# Patient Record
Sex: MALE | Race: BLACK OR AFRICAN AMERICAN | NOT HISPANIC OR LATINO | Employment: FULL TIME | URBAN - METROPOLITAN AREA
[De-identification: names, ages, dates, MRNs, and addresses within clinical notes are randomized per-mention and may not be internally consistent; named-entity substitution may affect disease eponyms.]

---

## 2018-06-19 ENCOUNTER — HOSPITAL ENCOUNTER (EMERGENCY)
Facility: HOSPITAL | Age: 38
Discharge: HOME | End: 2018-06-19
Attending: EMERGENCY MEDICINE | Admitting: EMERGENCY MEDICINE

## 2018-06-19 VITALS
WEIGHT: 193 LBS | OXYGEN SATURATION: 99 % | HEART RATE: 73 BPM | SYSTOLIC BLOOD PRESSURE: 135 MMHG | TEMPERATURE: 98.6 F | RESPIRATION RATE: 18 BRPM | BODY MASS INDEX: 31.63 KG/M2 | DIASTOLIC BLOOD PRESSURE: 88 MMHG
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
## 2018-06-19 DIAGNOSIS — L25.9 CONTACT DERMATITIS: Primary | ICD-10-CM

## 2018-06-19 PROCEDURE — 99282 EMERGENCY DEPT VISIT SF MDM: CPT

## 2018-06-19 RX ORDER — METHYLPREDNISOLONE 4 MG/1
TABLET ORAL
Qty: 21 TABLET | Refills: 0 | Status: SHIPPED | OUTPATIENT
Start: 2018-06-19 | End: 2022-07-27

## 2018-06-19 RX ORDER — PREDNISONE 20 MG/1
60 TABLET ORAL ONCE
Status: COMPLETED | OUTPATIENT
Start: 2018-06-19 | End: 2018-06-19

## 2018-06-19 RX ADMIN — PREDNISONE 60 MG: 20 TABLET ORAL at 13:02

## 2018-06-19 NOTE — ED PROVIDER NOTES
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  Patient is not sure if he was exposed to any poison as plans  Patient now has scattered itchiness through his bilateral upper extremities and upper back  Patient denies any tongue swelling, lip swelling, shortness of breath, chest pain, nausea, vomiting, diarrhea, dysuria, headaches, weakness or dizziness  History provided by:  Patient      None       History reviewed  No pertinent past medical history  History reviewed  No pertinent surgical history  History reviewed  No pertinent family history  I have reviewed and agree with the history as documented  Social History   Substance Use Topics    Smoking status: Current Every Day Smoker     Packs/day: 0 50    Smokeless tobacco: Not on file    Alcohol use Yes        Review of Systems   Constitutional: Negative for activity change, fatigue and fever  HENT: Negative for congestion, ear discharge and sore throat  Eyes: Negative for pain and redness  Respiratory: Negative for cough, chest tightness, shortness of breath and wheezing  Cardiovascular: Negative for chest pain  Gastrointestinal: Negative for abdominal pain, diarrhea, nausea and vomiting  Endocrine: Negative for cold intolerance  Genitourinary: Negative for dysuria and urgency  Musculoskeletal: Negative for arthralgias and back pain  Skin: Positive for rash  Neurological: Negative for dizziness, weakness and headaches  Psychiatric/Behavioral: Negative for agitation and behavioral problems         Physical Exam  Physical Exam   Constitutional: He is oriented to person, place, and time  He appears well-developed and well-nourished  HENT:   Head: Normocephalic and atraumatic  Nose: Nose normal    Mouth/Throat: Oropharynx is clear and moist    Eyes: Conjunctivae and EOM are normal    Neck: Normal range of motion  Neck supple  Cardiovascular: Normal rate, regular rhythm and normal heart sounds  Pulmonary/Chest: Effort normal and breath sounds normal    Abdominal: Soft  Bowel sounds are normal  He exhibits no distension  There is no tenderness  Musculoskeletal: Normal range of motion  Neurological: He is alert and oriented to person, place, and time  Skin: Skin is warm  2 cm x 2 cm area of erythema and induration noted to left anterior forearm that is consistent of insect bite  Scattered maculopapular lesions noted to the right forearm and upper back  Psychiatric: He has a normal mood and affect  His behavior is normal  Judgment and thought content normal    Nursing note and vitals reviewed        Vital Signs  ED Triage Vitals [06/19/18 1244]   Temperature Pulse Respirations Blood Pressure SpO2   98 6 °F (37 °C) 73 18 135/88 99 %      Temp Source Heart Rate Source Patient Position - Orthostatic VS BP Location FiO2 (%)   Tympanic Monitor -- -- --      Pain Score       --           Vitals:    06/19/18 1244 06/19/18 1245   BP: 135/88 135/88   Pulse: 73        Visual Acuity      ED Medications  Medications   predniSONE tablet 60 mg (60 mg Oral Given 6/19/18 1302)       Diagnostic Studies  Results Reviewed     None                 No orders to display              Procedures  Procedures       Phone Contacts  ED Phone Contact    ED Course                               MDM  Number of Diagnoses or Management Options  Contact dermatitis:   Insect bite, initial encounter:   Risk of Complications, Morbidity, and/or Mortality  General comments: Patient's history and physical is consistent with insect bite and contact dermatitis  At this point patient is placed on prednisone taper  Patient is discharged home with p r n  Benadryl for itching and hydrocortisone ointment to the insect bite  Patient to follow up with PCP in 2-3 days  Close return instructions given to return to the ER for any worsening symptoms  Patient agrees with discharge plan  Patient well appearing at time of discharge  Patient Progress  Patient progress: stable    CritCare Time    Disposition  Final diagnoses:   Contact dermatitis   Insect bite, initial encounter     Time reflects when diagnosis was documented in both MDM as applicable and the Disposition within this note     Time User Action Codes Description Comment    6/19/2018 12:53 PM La aHnkins Add [L25 9] Contact dermatitis     6/19/2018 12:53 PM Rosemarie Cantu Add Cuate Bynum  XXXA] Insect bite, initial encounter       ED Disposition     ED Disposition Condition Comment    Discharge  Aleta Singh discharge to home/self care  Condition at discharge: Good        Follow-up Information     Follow up With Specialties Details Why [address], [place] Family Medicine In 2 days  [facility]  [address] [place] [address]  [phone]            Patient's Medications   Discharge Prescriptions    METHYLPREDNISOLONE 4 MG TBPK    Use as directed on package       Start Date: 6/19/2018 End Date: --       Order Dose: --       Quantity: 21 tablet    Refills: 0     No discharge procedures on file      ED Provider  Electronically Signed by           Velma Ryder DO  06/19/18 1490

## 2018-06-19 NOTE — DISCHARGE INSTRUCTIONS
Please take a list of all of your medications and discharge paperwork with you to all of your follow-up medical visits  You can take 50 mg Benadryl every 6 hr as needed for itching  Please apply over-the-counter hydrocortisone ointment to the insect bite  Please take all of your medications as directed  Please call your family doctor or return to the ER if you have increased shortness of breath, chest pain, fevers, chills, nausea, vomiting, diarrhea, or any other worsening symptoms  Contact Dermatitis   WHAT YOU NEED TO KNOW:   Contact dermatitis is a skin rash  It develops when you touch something that irritates your skin or causes an allergic reaction  DISCHARGE INSTRUCTIONS:   Call 911 for any of the following:   · You have sudden trouble breathing  · Your throat swells and you have trouble eating  · Your face is swollen  Contact your healthcare provider if:   · You have a fever  · Your blisters are draining pus  · Your rash spreads or does not get better, even after treatment  · You have questions or concerns about your condition or care  Medicines:   · Medicines  help decrease itching and swelling  They will be given as a topical medicine to apply to your rash or as a pill  · Take your medicine as directed  Contact your healthcare provider if you think your medicine is not helping or if you have side effects  Tell him or her if you are allergic to any medicine  Keep a list of the medicines, vitamins, and herbs you take  Include the amounts, and when and why you take them  Bring the list or the pill bottles to follow-up visits  Carry your medicine list with you in case of an emergency  Manage contact dermatitis:   · Take short baths or showers in cool water  Use mild soap or soap-free cleansers  Add oatmeal, baking soda, or cornstarch to the bath water to help decrease skin irritation  · Avoid skin irritants , such as makeup, hair products, soaps, and cleansers   Use products that do not contain perfume or dye  · Apply a cool compress to your rash  This will help soothe your skin  · Keep your skin moist   Rub unscented cream or lotion on your skin to prevent dryness and itching  Do this right after a bath or shower when your skin is still damp  Follow up with your healthcare provider or dermatologist in 2 to 3 days:  Write down your questions so you remember to ask them during your visits  © 2017 [address]  Information is for End User's use only and may not be sold, redistributed or otherwise used for commercial purposes  All illustrations and images included in CareNotes® are the copyrighted property of A D A M , Inc  or Alexandru Gardner  The above information is an  only  It is not intended as medical advice for individual conditions or treatments  Talk to your doctor, nurse or pharmacist before following any medical regimen to see if it is safe and effective for you  Insect Bite or Sting   WHAT YOU NEED TO KNOW:   Most insect bites and stings are not dangerous and go away without treatment  Your symptoms may be mild, or you may develop anaphylaxis  Anaphylaxis is a sudden, life-threatening reaction that needs immediate treatment  Common examples of insects that bite or sting are bees, ticks, mosquitoes, spiders, and ants  Insect bites or stings can lead to diseases such as malaria, West Nile virus, Lyme disease, or [place] Spotted Fever  DISCHARGE INSTRUCTIONS:   Call 911 for signs or symptoms of anaphylaxis,  such as trouble breathing, swelling in your mouth or throat, or wheezing  You may also have itching, a rash, hives, or feel like you are going to faint  Return to the emergency department if:   · You are stung on your tongue or in your throat  · A white area forms around the bite  · You are sweating badly or have body pain  · You think you were bitten or stung by a poisonous insect    Contact your healthcare provider if:   · You have a fever  · The area becomes red, warm, tender, and swollen beyond the area of the bite or sting  · You have questions or concerns about your condition or care  Medicines:   · Antihistamines  decrease itching and rash  · Epinephrine  is used to treat severe allergic reactions such as anaphylaxis  · Take your medicine as directed  Contact your healthcare provider if you think your medicine is not helping or if you have side effects  Tell him of her if you are allergic to any medicine  Keep a list of the medicines, vitamins, and herbs you take  Include the amounts, and when and why you take them  Bring the list or the pill bottles to follow-up visits  Carry your medicine list with you in case of an emergency  Steps to take for signs or symptoms of anaphylaxis:   · Immediately  give 1 shot of epinephrine only into the outer thigh muscle  · Leave the shot in place  as directed  Your healthcare provider may recommend you leave it in place for up to 10 seconds before you remove it  This helps make sure all of the epinephrine is delivered  · Call 911 and go to the emergency department,  even if the shot improved symptoms  Do not drive yourself  Bring the used epinephrine shot with you  Safety precautions to take if you are at risk for anaphylaxis:   · Keep 2 shots of epinephrine with you at all times  You may need a second shot, because epinephrine only works for about 20 minutes and symptoms may return  Your healthcare provider can show you and family members how to give the shot  Check the expiration date every month and replace it before it expires  · Create an action plan  Your healthcare provider can help you create a written plan that explains the allergy and an emergency plan to treat a reaction   The plan explains when to give a second epinephrine shot if symptoms return or do not improve after the first  Give copies of the action plan and emergency instructions to family members, work and school staff, and  providers  Show them how to give a shot of epinephrine  · Carry medical alert identification  Wear medical alert jewelry or carry a card that says you have an insect allergy  Ask your healthcare provider where to get these items  If an insect bites or stings you:   · Remove the stinger  Scrape the stinger out with your fingernail, edge of a credit card, or a knife blade  Do not squeeze the wound  Gently wash the area with soap and water  · Remove the tick  Ticks must be removed as soon as possible so you do not get diseases passed through tick bites  Ask your healthcare provider for more information on tick bites and how to remove ticks  Care for a bite or sting wound:   · Elevate the affected area  Prop the wound above the level of your heart, if possible  Elevate the area for 10 to 20 minutes each hour or as directed by your healthcare provider  · Use compresses  Soak a clean washcloth in cold water, wring it out, and put it on the bite or sting  Use the compress for 10 to 20 minutes each hour or as directed by your healthcare provider  After 24 to 48 hours, change to warm compresses  · Apply a paste  Add water to baking soda to make a thick paste  Put the paste on the area for 5 minutes  Rinse gently to remove the paste  Prevent another insect bite or sting:   · Do not wear bright-colored or flower-print clothing when you plan to spend time outdoors  Do not use hairspray, perfumes, or aftershave  · Do not leave food out  · Empty any standing water and wash container with soap and water every 2 days  · Put screens on all open windows and doors  · Put insect repellent that contains DEET on skin that is showing when you go outside  Put insect repellent at the top of your boots, bottom of pant legs, and sleeve cuffs  Wear long sleeves, pants, and shoes      · Use citronella candles outdoors to help keep mosquitoes away  Put a tick and flea collar on pets  Follow up with your healthcare provider as directed:  Write down your questions so you remember to ask them during your visits  © 2017 [address] Information is for End User's use only and may not be sold, redistributed or otherwise used for commercial purposes  All illustrations and images included in CareNotes® are the copyrighted property of A D A M , Inc  or Alexandru Gardner  The above information is an  only  It is not intended as medical advice for individual conditions or treatments  Talk to your doctor, nurse or pharmacist before following any medical regimen to see if it is safe and effective for you

## 2018-06-28 ENCOUNTER — VBI (OUTPATIENT)
Dept: FAMILY MEDICINE CLINIC | Facility: CLINIC | Age: 38
End: 2018-06-28

## 2018-06-28 NOTE — TELEPHONE ENCOUNTER
Pt was seen in [address] on 6/19/18  CC: Rash  DX: Contact dermatitis; insect bite  Left message pt is aware of office hours and phone number

## 2022-07-27 ENCOUNTER — OFFICE VISIT (OUTPATIENT)
Dept: FAMILY MEDICINE CLINIC | Facility: CLINIC | Age: 42
End: 2022-07-27
Payer: COMMERCIAL

## 2022-07-27 VITALS
HEIGHT: 66 IN | HEART RATE: 59 BPM | TEMPERATURE: 96.7 F | DIASTOLIC BLOOD PRESSURE: 88 MMHG | RESPIRATION RATE: 17 BRPM | WEIGHT: 183.2 LBS | OXYGEN SATURATION: 99 % | BODY MASS INDEX: 29.44 KG/M2 | SYSTOLIC BLOOD PRESSURE: 138 MMHG

## 2022-07-27 DIAGNOSIS — M54.50 ACUTE BILATERAL LOW BACK PAIN WITHOUT SCIATICA: Primary | ICD-10-CM

## 2022-07-27 DIAGNOSIS — F31.30 BIPOLAR AFFECTIVE DISORDER, CURRENT EPISODE DEPRESSED, CURRENT EPISODE SEVERITY UNSPECIFIED (HCC): ICD-10-CM

## 2022-07-27 DIAGNOSIS — F32.A DEPRESSION, UNSPECIFIED DEPRESSION TYPE: ICD-10-CM

## 2022-07-27 DIAGNOSIS — F43.10 PTSD (POST-TRAUMATIC STRESS DISORDER): ICD-10-CM

## 2022-07-27 DIAGNOSIS — F41.9 ANXIETY: ICD-10-CM

## 2022-07-27 LAB
SL AMB  POCT GLUCOSE, UA: NEGATIVE
SL AMB LEUKOCYTE ESTERASE,UA: NEGATIVE
SL AMB POCT BILIRUBIN,UA: NEGATIVE
SL AMB POCT BLOOD,UA: NEGATIVE
SL AMB POCT CLARITY,UA: CLEAR
SL AMB POCT COLOR,UA: YELLOW
SL AMB POCT KETONES,UA: NEGATIVE
SL AMB POCT NITRITE,UA: NEGATIVE
SL AMB POCT PH,UA: 6
SL AMB POCT SPECIFIC GRAVITY,UA: 1.02
SL AMB POCT URINE PROTEIN: NEGATIVE
SL AMB POCT UROBILINOGEN: NORMAL

## 2022-07-27 PROCEDURE — 81003 URINALYSIS AUTO W/O SCOPE: CPT | Performed by: NURSE PRACTITIONER

## 2022-07-27 PROCEDURE — 3725F SCREEN DEPRESSION PERFORMED: CPT | Performed by: NURSE PRACTITIONER

## 2022-07-27 PROCEDURE — 87086 URINE CULTURE/COLONY COUNT: CPT | Performed by: NURSE PRACTITIONER

## 2022-07-27 PROCEDURE — 99203 OFFICE O/P NEW LOW 30 MIN: CPT | Performed by: NURSE PRACTITIONER

## 2022-07-27 NOTE — PROGRESS NOTES
Assessment/Plan:  Will return to office if symptoms reoccurs  1  Acute bilateral low back pain without sciatica  -     POCT urine dip auto non-scope  -     Urine culture    2  Bipolar affective disorder, current episode depressed, current episode severity unspecified ([facility] [address] )  -     Ambulatory Referral to Psychiatry; Future    3  PTSD (post-traumatic stress disorder)  -     Ambulatory Referral to Psychiatry; Future    4  Anxiety  -     Ambulatory Referral to Psychiatry; Future    5  Depression, unspecified depression type  -     Ambulatory Referral to Psychiatry; Future              Patient Instructions:  Supportive care discussed and advised  Advised to RTO for any worsening and no improvement  Follow up for no improvement and worsening of conditions  Patient advised and educated when to see immediate medical care  Return if symptoms worsen or fail to improve  No future appointments  Subjective:      Patient ID: Nidhi Rockwell is a 38 y o  male  Chief Complaint   Patient presents with    Flank Pain     Kidney pain on both sides for the past few days nm  lpn    Urinary Frequency         Vitals:  /88   Pulse 59   Temp (!) 96 7 °F (35 9 °C)   Resp 17   Ht 5' 5 5" (1 664 m)   Wt 83 1 kg (183 lb 3 2 oz)   SpO2 99%   BMI 30 02 kg/m²     HPI  New to practice  Personal and family medical history reviewed  Stated that from 7/23 to 7/24 had bilateral back pain at kidney area but no urinary symptoms and issues  Took azo on 7/24/2022 and kept himself hydrated and pain is resolved now but as already had appt then wanted to come up  Currently asymptomatic  Urine dip in office is unremarkable and will follow with urine culture results  Has h/o bipolar, anxiety, depression and PTSD and recently moved back from [facility] and used to live here and was under care of family guidance and will follow back with them   Stated that was not able to work due to his anxiety and quit job and would like to discuss disability with family guidance as informed that we do not do disability in this office and needs to follow with specialist  The patient was advised about the office disability policy and that no forms of this nature would be promised or completed  This was explained clearly to the patient's understanding  PHQ-2/9 Depression Screening    Little interest or pleasure in doing things: 1 - several days  Feeling down, depressed, or hopeless: 2 - more than half the days  Trouble falling or staying asleep, or sleeping too much: 3 - nearly every day  Feeling tired or having little energy: 1 - several days  Poor appetite or overeatin - not at all  Feeling bad about yourself - or that you are a failure or have let yourself or your family down: 1 - several days  Trouble concentrating on things, such as reading the newspaper or watching television: 0 - not at all  Moving or speaking so slowly that other people could have noticed  Or the opposite - being so fidgety or restless that you have been moving around a lot more than usual: 1 - several days  Thoughts that you would be better off dead, or of hurting yourself in some way: 0 - not at all  PHQ-2 Score: 3  PHQ-2 Interpretation: POSITIVE depression screen  PHQ-9 Score: 9   PHQ-9 Interpretation: Mild depression              The following portions of the patient's history were reviewed and updated as appropriate: allergies, current medications, past family history, past medical history, past social history, past surgical history and problem list       Review of Systems   Constitutional: Negative for chills, diaphoresis, fatigue, fever and unexpected weight change  Respiratory: Negative  Cardiovascular: Negative  Gastrointestinal: Negative for abdominal pain, nausea and vomiting  Genitourinary: Negative for decreased urine volume, difficulty urinating, dysuria, flank pain, frequency, genital sores, hematuria and urgency     Musculoskeletal: Negative  Skin: Negative  Neurological: Negative for dizziness and headaches  Objective:    Social History     Tobacco Use   Smoking Status Current Every Day Smoker    Packs/day: 0 50   Smokeless Tobacco Never Used   Tobacco Comment    trying to quit under 6 cigrates a day       Allergies: No Known Allergies      Current Outpatient Medications   Medication Sig Dispense Refill    sertraline (ZOLOFT) 50 mg tablet Take 50 mg by mouth daily      VITAMIN D PO Take by mouth once a week       No current facility-administered medications for this visit  Physical Exam  Constitutional:       Appearance: Normal appearance  He is well-developed  Cardiovascular:      Rate and Rhythm: Normal rate and regular rhythm  Heart sounds: Normal heart sounds  Pulmonary:      Effort: Pulmonary effort is normal       Breath sounds: Normal breath sounds  Abdominal:      General: Bowel sounds are normal  There is no distension  Palpations: Abdomen is soft  Tenderness: There is no abdominal tenderness  There is no right CVA tenderness, left CVA tenderness or rebound  Musculoskeletal:         General: No tenderness  Normal range of motion  Skin:     General: Skin is warm and dry  Neurological:      Mental Status: He is alert and oriented to person, place, and time  Psychiatric:         Behavior: Behavior normal          Thought Content:  Thought content normal          Judgment: Judgment normal              Recent Results (from the past 24 hour(s))   POCT urine dip auto non-scope    Collection Time: 07/27/22 10:53 AM   Result Value Ref Range     COLOR,UA yellow     CLARITY,UA clear     SPECIFIC GRAVITY,UA 1 020      PH,UA 6 0     LEUKOCYTE ESTERASE,UA negative     NITRITE,UA negative     GLUCOSE, UA negative     KETONES,UA negative     BILIRUBIN,UA negative     BLOOD,UA negative     POCT URINE PROTEIN negative     SL AMB POCT UROBILINOGEN 0 2 E U  /dL              ANIRUDH Miles

## 2022-07-29 LAB — BACTERIA UR CULT: ABNORMAL

## 2022-08-02 ENCOUNTER — TELEPHONE (OUTPATIENT)
Dept: FAMILY MEDICINE CLINIC | Facility: CLINIC | Age: 42
End: 2022-08-02

## 2022-08-02 ENCOUNTER — TELEPHONE (OUTPATIENT)
Dept: PSYCHIATRY | Facility: CLINIC | Age: 42
End: 2022-08-02

## 2022-08-02 NOTE — TELEPHONE ENCOUNTER
----- Message from Carlos Wilson, Skylar Stephen sent at 8/2/2022  2:15 PM EDT -----  Please let patient know that his urine showed some bacteria but not enough to be positive for infection and if having any issues then please follow back   ANIRUDH Jean

## 2022-08-02 NOTE — TELEPHONE ENCOUNTER
Spoke to pt in regards to referral and informed pt of waitlist  Pt will be placed on waitlist for both talk therapy and med mgmt

## 2022-08-02 NOTE — TELEPHONE ENCOUNTER
Spoke to patient and informed him  He stated that he is not having any issues anymore  I informed him to call us back if anything changes and his symptoms come back  KASHMIR Ocasio, MARYN

## 2022-08-04 ENCOUNTER — TELEPHONE (OUTPATIENT)
Dept: PSYCHIATRY | Facility: CLINIC | Age: 42
End: 2022-08-04

## 2022-08-04 NOTE — TELEPHONE ENCOUNTER
Behavorial Health Outpatient Intake Questions    Referred by: Number on the back of insurance card     Please advised interviewee that they need to answer all questions truthfully to allow for best care and any misrepresentations of information may affect their ability to be seen at this clinic   => Was this discussed? Yes     [facility] -     Presenting Problem (in patient's words):   Been taking things personally which brings out anger and stress   Are there any developmental disabilities? ? If yes, can they speak to you on the phone? If they are too limited to speak to you on phone, refer out Yes, OCD    Are you taking any psychiatric medications? Yes, Dr Demarcus Oconnor    => If yes, who prescribes? If yes, are they injectable medications? Does the patient have a language barrier or hearing impairment? No    Have you been treated at [facility] by a therapist or a doctor in the past? If yes, who? Yes, [facility]     Has the patient been hospitalized for mental health? Yes   If yes, how long ago was last hospitalization and where was it? Checked self in was there for two hours and started [facility]   Do you actively use alcohol or marijuana or illegal substances? If yes, what and how much - refer out to Drug and alcohol treatment if use is excessive or daily use of illegal substances No concerns of substance abuse are reported  Do you have a community treatment team or ? No    Legal History-     Does the patient have any history of arrests, nursing home/penitentiary time, or DUIs? No  If Yes-  1) What types of charges? 2) When were they last incarcerated? 3) Are they currently on parole or probation? Minor Child-    Who has custody of the child? Is there a custody agreement?      If there is a custody agreement remind parent that they must bring a copy to the first appt or they will not be seen  Intake Team, please check with provider before scheduling if flags come up such as:  - complex case  - legal history (other than DUI)  - communication barrier concerns are present  - if, in your judgment, this needs further review    ACCEPTED as a patient Yes  => Appointment Date: Tuesday August 16, 2022 at 12:00 pm with Patricia Gonzalez and August 25, 2022 at 10:45 am with Dr Jacek Holloway? No    Name of Insurance Co: [employer] ID# [id number]  NLDURONDB Phone #  If ins is primary or secondary  If patient is a minor, parents information such as Name, D  O B of guarantor

## 2022-08-16 ENCOUNTER — SOCIAL WORK (OUTPATIENT)
Dept: BEHAVIORAL/MENTAL HEALTH CLINIC | Facility: CLINIC | Age: 42
End: 2022-08-16
Payer: COMMERCIAL

## 2022-08-16 DIAGNOSIS — F31.30 BIPOLAR AFFECTIVE DISORDER, CURRENT EPISODE DEPRESSED, CURRENT EPISODE SEVERITY UNSPECIFIED (HCC): Primary | ICD-10-CM

## 2022-08-16 DIAGNOSIS — F32.A DEPRESSION, UNSPECIFIED DEPRESSION TYPE: ICD-10-CM

## 2022-08-16 DIAGNOSIS — F41.1 GENERALIZED ANXIETY DISORDER: ICD-10-CM

## 2022-08-16 DIAGNOSIS — F43.10 PTSD (POST-TRAUMATIC STRESS DISORDER): ICD-10-CM

## 2022-08-16 PROCEDURE — 90791 PSYCH DIAGNOSTIC EVALUATION: CPT | Performed by: SOCIAL WORKER

## 2022-08-25 ENCOUNTER — OFFICE VISIT (OUTPATIENT)
Dept: PSYCHIATRY | Facility: CLINIC | Age: 42
End: 2022-08-25
Payer: COMMERCIAL

## 2022-08-25 DIAGNOSIS — F31.30 BIPOLAR AFFECTIVE DISORDER, CURRENT EPISODE DEPRESSED, CURRENT EPISODE SEVERITY UNSPECIFIED (HCC): ICD-10-CM

## 2022-08-25 DIAGNOSIS — F41.9 ANXIETY: ICD-10-CM

## 2022-08-25 DIAGNOSIS — F33.1 MODERATE EPISODE OF RECURRENT MAJOR DEPRESSIVE DISORDER (HCC): Primary | ICD-10-CM

## 2022-08-25 DIAGNOSIS — F43.10 PTSD (POST-TRAUMATIC STRESS DISORDER): ICD-10-CM

## 2022-08-25 PROCEDURE — 90792 PSYCH DIAG EVAL W/MED SRVCS: CPT | Performed by: NURSE PRACTITIONER

## 2022-08-25 RX ORDER — SERTRALINE HYDROCHLORIDE 100 MG/1
100 TABLET, FILM COATED ORAL DAILY
Qty: 30 TABLET | Refills: 3 | Status: SHIPPED | OUTPATIENT
Start: 2022-08-25 | End: 2022-09-22

## 2022-08-25 NOTE — BH TREATMENT PLAN
Whom  TREATMENT PLAN (Medication Management Only)        [facility]    Name and Date of Birth:  Libertad Liang 38 y o  1980  Date of Treatment Plan: August 25, 2022  Diagnosis/Diagnoses:    1  Moderate episode of recurrent major depressive disorder ([facility] )    2  Bipolar affective disorder, current episode depressed, current episode severity unspecified ([facility] )    3  PTSD (post-traumatic stress disorder)    4  Anxiety      Strengths/Personal Resources for Self-Care: supportive family, taking medications as prescribed, ability to communicate well, ability to listen, ability to reason, average or above intelligence, general fund of knowledge, motivation for treatment  Area/Areas of need (in own words): anxiety symptoms, depressive symptoms, mood instability  1  Long Term Goal: continue improvement in mood stability  Target Date:3 months - 11/25/2022  Person/Persons responsible for completion of goal: Garcia  2  Short Term Objective (s) - How will we reach this goal?:   A  Provider new recommended medication/dosage changes and/or continue medication(s): continue current medications as prescribed  B  N/A   C  N/A  Target Date:3 months - 11/25/2022  Person/Persons Responsible for Completion of Goal: [facility]  Progress Towards Goals: continuing treatment  Treatment Modality: medication management every 3 months  Review due 180 days from date of this plan: 3 months - 11/25/2022  Expected length of service: ongoing treatment  My Physician/PA/NP and I have developed this plan together and I agree to work on the goals and objectives  I understand the treatment goals that were developed for my treatment
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
## 2022-08-26 ENCOUNTER — TELEPHONE (OUTPATIENT)
Dept: PSYCHIATRY | Facility: CLINIC | Age: 42
End: 2022-08-26

## 2022-08-30 ENCOUNTER — SOCIAL WORK (OUTPATIENT)
Dept: BEHAVIORAL/MENTAL HEALTH CLINIC | Facility: CLINIC | Age: 42
End: 2022-08-30
Payer: COMMERCIAL

## 2022-08-30 DIAGNOSIS — F32.A DEPRESSION, UNSPECIFIED DEPRESSION TYPE: ICD-10-CM

## 2022-08-30 DIAGNOSIS — F41.1 GENERALIZED ANXIETY DISORDER: ICD-10-CM

## 2022-08-30 DIAGNOSIS — F43.10 PTSD (POST-TRAUMATIC STRESS DISORDER): Primary | ICD-10-CM

## 2022-08-30 PROCEDURE — 90834 PSYTX W PT 45 MINUTES: CPT | Performed by: SOCIAL WORKER

## 2022-08-30 NOTE — PSYCH
Psychotherapy Provided: Individual Psychotherapy 45 minutes     Length of time in session: 45 minutes, follow up in 2 week    Goals addressed in session: Goal 1 and Goal 2     Pain:  No      Current suicide risk : Low       DATA:  Candelaria Robin reported feeling slightly less anxious and depressed since our first meeting, sharing htat he felt heard and understood and has some hope  He reported that he has been socializing more which has been helpful  The bulk of hte session was spent creating his treatment plan together  Candelaria Robin became tearful toward the end when we began to speak about his relationships with his grandchildren as well as nightmares he has  Candelaria Robin reported that he has been keeping his gratitude journal as well and it has been helpful  Modalities used:  Client centered, compassioned focused, motivational interviewing, solution focused, strength based, trauma informed  ASSESSMENT: Candelaria Robin presented with sad/anxious mood and full affect  He was engaged and cooperative, tearful at times, with good eye contact  Garcia had good insight and judgement  His thought processes were well organized and showed no evidence of psychosis  Candelaria Robin presents with minimal risk for suicide, self harm or harm towards others  PLAN:  Candelaria Robin will continue with his gratitude journal   Begin to work on goals  [facility] Treatment [facility]: Diagnosis and Treatment Plan explained to Oanh Kirk relates understanding diagnosis and is agreeable to Treatment Plan   Yes

## 2022-08-30 NOTE — BH TREATMENT PLAN
Cade Thurston  1980       Date of Initial Treatment Plan: 8/30/22   Date of Current Treatment Plan: 08/30/22    Treatment Plan Number 1     Strengths/Personal Resources for Self Care: Exercise, take vitamins, eat healthy, like to meditate, read; loyal, quick to help people, empathy    Diagnosis:   No diagnosis found  Area of Needs: Anger, anxiety      Long Term Goal 1: "Ways to cope with edginess, can't keep still, learn ways to stay calm" - feel less anxious    Target Date: 11/30/22  Completion Date: N/A         Short Term Objectives for Goal 1: Kel Connors will describe worry and anxiety and how it impacts his daily functioning; Kel Connors will be educated on cognitive and psychological and behavioral components of anxiety; Kel Connors will learn and implement 3 calming skills; Kel Connors will be educated on CBT and identify, challenge and change anxiety producing thoughts    Long Term Goal 2: "Better ways to cope with anger other than screaming"    Target Date: 11/30/22  Completion Date: N/A    Short Term Objectives for Goal 2: Kel Connors will identify triggers to emotional dysregulation; Kel Connors will develop 3 alternate coping skills to address anger; Kel Connors will identify feelings that underly anger and learn and practice strategies to manage those emotions; Kel Connors will verbalize understanding of how anger response came to be and how he can work to let that go by learning alternate strategies to protect himself  GOAL 1: Modality: Individual 2x per month   Completion Date TBD and The person(s) responsible for carrying out the plan is  Garcia (client) Antonella Keenan (therapist)    GOAL 2: Modality: Individual 2x per month   Completion Date TBD and The person(s) responsible for carrying out the plan is  Garcia (client) and Prince Keenan (therapist)        [address]: Diagnosis and Treatment Plan explained to Kanu Bernard relates understanding diagnosis and is agreeable to Treatment Plan         Client Comments : Please share your thoughts, feelings, need and/or experiences regarding your treatment plan: none

## 2022-09-13 ENCOUNTER — SOCIAL WORK (OUTPATIENT)
Dept: BEHAVIORAL/MENTAL HEALTH CLINIC | Facility: CLINIC | Age: 42
End: 2022-09-13
Payer: COMMERCIAL

## 2022-09-13 DIAGNOSIS — F41.1 GENERALIZED ANXIETY DISORDER: ICD-10-CM

## 2022-09-13 DIAGNOSIS — F31.4 BIPOLAR DISORDER, CURRENT EPISODE DEPRESSED, SEVERE, WITHOUT PSYCHOTIC FEATURES (HCC): ICD-10-CM

## 2022-09-13 DIAGNOSIS — F43.10 PTSD (POST-TRAUMATIC STRESS DISORDER): Primary | ICD-10-CM

## 2022-09-13 PROCEDURE — 90834 PSYTX W PT 45 MINUTES: CPT | Performed by: SOCIAL WORKER
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
## 2022-09-22 ENCOUNTER — OFFICE VISIT (OUTPATIENT)
Dept: PSYCHIATRY | Facility: CLINIC | Age: 42
End: 2022-09-22
Payer: COMMERCIAL

## 2022-09-22 DIAGNOSIS — F31.4 BIPOLAR DISORDER, CURRENT EPISODE DEPRESSED, SEVERE, WITHOUT PSYCHOTIC FEATURES (HCC): ICD-10-CM

## 2022-09-22 DIAGNOSIS — F43.10 PTSD (POST-TRAUMATIC STRESS DISORDER): ICD-10-CM

## 2022-09-22 DIAGNOSIS — F31.81 BIPOLAR 2 DISORDER (HCC): Primary | ICD-10-CM

## 2022-09-22 PROCEDURE — 99214 OFFICE O/P EST MOD 30 MIN: CPT | Performed by: NURSE PRACTITIONER

## 2022-09-22 PROCEDURE — 90833 PSYTX W PT W E/M 30 MIN: CPT | Performed by: NURSE PRACTITIONER

## 2022-09-22 RX ORDER — RISPERIDONE 0.5 MG/1
0.5 TABLET, FILM COATED ORAL 2 TIMES DAILY
Qty: 30 TABLET | Refills: 3 | Status: SHIPPED | OUTPATIENT
Start: 2022-09-22

## 2022-09-22 NOTE — BH TREATMENT PLAN
TREATMENT PLAN (Medication Management Only)        [facility]    Name and Date of Birth:  Irasema Moon 38 y o  1980  Date of Treatment Plan: September 22, 2022  Diagnosis/Diagnoses:    1  Bipolar 2 disorder ([facility] )    2  Bipolar disorder, current episode depressed, severe, without psychotic features ([facility] )    3  PTSD (post-traumatic stress disorder)      Strengths/Personal Resources for Self-Care: supportive family, ability to communicate well, ability to listen, ability to reason, average or above intelligence, motivation for treatment  Area/Areas of need (in own words): depressive symptoms  1  Long Term Goal: continue improvement in depression  Target Date:3 months - 12/22/2022  Person/Persons responsible for completion of goal: Garcia Lainez  Short Term Objective (s) - How will we reach this goal?:   A  Provider new recommended medication/dosage changes and/or continue medication(s): continue current medications as prescribed  B  N/A   C  N/A  Target Date:3 months - 12/22/2022  Person/Persons Responsible for Completion of Goal: Terrance Shipley  Progress Towards Goals: continuing treatment  Treatment Modality: medication management every 3 months  Review due 180 days from date of this plan: 3 months - 12/22/2022  Expected length of service: ongoing treatment  My Physician/PA/NP and I have developed this plan together and I agree to work on the goals and objectives  I understand the treatment goals that were developed for my treatment
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
## 2022-10-20 ENCOUNTER — OFFICE VISIT (OUTPATIENT)
Dept: PSYCHIATRY | Facility: CLINIC | Age: 42
End: 2022-10-20
Payer: COMMERCIAL

## 2022-10-20 DIAGNOSIS — F31.4 BIPOLAR DISORDER, CURRENT EPISODE DEPRESSED, SEVERE, WITHOUT PSYCHOTIC FEATURES (HCC): Primary | ICD-10-CM

## 2022-10-20 PROCEDURE — 99214 OFFICE O/P EST MOD 30 MIN: CPT | Performed by: NURSE PRACTITIONER

## 2022-10-20 PROCEDURE — 90833 PSYTX W PT W E/M 30 MIN: CPT | Performed by: NURSE PRACTITIONER

## 2022-10-20 NOTE — BH TREATMENT PLAN
TREATMENT PLAN (Medication Management Only)        [facility]    Name and Date of Birth:  Clive Carr 38 y o  1980  Date of Treatment Plan: October 20, 2022  Diagnosis/Diagnoses:    1  Bipolar disorder, current episode depressed, severe, without psychotic features [facility])      Strengths/Personal Resources for Self-Care: supportive family, taking medications as prescribed, ability to communicate well, ability to listen, ability to reason, average or above intelligence, motivation for treatment  Area/Areas of need (in own words): mood instability  1  Long Term Goal: continue improvement in mood stability  Target Date:3 months - 1/20/2023  Person/Persons responsible for completion of goal: Garcia  2  Short Term Objective (s) - How will we reach this goal?:   A  Provider new recommended medication/dosage changes and/or continue medication(s): continue current medications as prescribed  B  N/A   C  N/A  Target Date:3 months - 1/20/2023  Person/Persons Responsible for Completion of Goal: Cayla Brunner  Progress Towards Goals: continuing treatment  Treatment Modality: medication management every 3 months  Review due 180 days from date of this plan: 3 months - 1/20/2023  Expected length of service: ongoing treatment  My Physician/PA/NP and I have developed this plan together and I agree to work on the goals and objectives  I understand the treatment goals that were developed for my treatment

## 2022-10-20 NOTE — PSYCH
This note was not shared with the patient due to reasonable likelihood of causing patient harm    PROGRESS NOTE        Keshav Browne      Name and Date of Birth:  Chayito Hummel 38 y o  1980    Date of Visit: 10/20/22    SUBJECTIVE:    Blu Basurto was seen today for follow-up and medication management  He was casually dressed, sleepy and oriented 3 X  His speech was clear and of normal rate, rhythm and volume  Thought processes were clear and goal-directed  Today quentin denied any anger, was not tearful, and also denied any suicidal ideation  He is worried about his wife what the time with this meeting was getting her test results to rule out pancreatic cancer  He is still thinking about applying for social security disability  While the risperidone is working well in controlling his symptoms and stabilizing his mood, he appeared to be too sedated  Therefore today I changed his dose 0 25 mg daily at bedtime  I will continue monitoring  No other new symptoms or side effects were reported  He denies suicidal ideation, intent or plan at present, has no suicidal ideation, intent or plan at present  He denies any auditory hallucinations and visual hallucinations, denies any other delusional thinking, denies any delusional thinking  He denies any side effects from medications    HPI ROS Appetite Changes and Sleep: normal appetite, normal sleep    Review Of Systems:      Constitutional As noted in HPI   ENT As noted in HPI   Cardiovascular As noted in HPI   Respiratory As noted in HPI   Gastrointestinal As noted in HPI   Genitourinary As noted in HPI   Musculoskeletal As noted in HPI   Integumentary As noted in HPI   Neurological As noted in HPI   Endocrine As noted in HPI   Other Symptoms Negative and None       Laboratory Results: No results found for this or any previous visit      Substance Abuse History:    Social History     Substance and Sexual Activity Drug Use Yes   • Types: Marijuana    Comment: not on medical       Family Psychiatric History:     Family History   Problem Relation Age of Onset   • Heart disease Mother    • Osteoporosis Mother    • HIV Sister    • No Known Problems Brother        The following portions of the patient's history were reviewed and updated as appropriate: past family history, past medical history, past social history, past surgical history and problem list     Social History     Socioeconomic History   • Marital status:      Spouse name: Not on file   • Number of children: Not on file   • Years of education: Not on file   • Highest education level: Not on file   Occupational History   • Not on file   Tobacco Use   • Smoking status: Current Every Day Smoker     Packs/day: 0 50   • Smokeless tobacco: Never Used   • Tobacco comment: trying to quit under 6 cigrates a day   Vaping Use   • Vaping Use: Never used   Substance and Sexual Activity   • Alcohol use:  Yes   • Drug use: Yes     Types: Marijuana     Comment: not on medical   • Sexual activity: Not Currently   Other Topics Concern   • Not on file   Social History Narrative   • Not on file     Social Determinants of Health     Financial Resource Strain: Not on file   Food Insecurity: Not on file   Transportation Needs: Not on file   Physical Activity: Not on file   Stress: Not on file   Social Connections: Not on file   Intimate Partner Violence: Not on file   Housing Stability: Not on file     Social History     Social History Narrative   • Not on file        Social History     Tobacco History     Smoking Status  Current Every Day Smoker Smoking Frequency  0 5 packs/day    Smokeless Tobacco Use  Never Used    Tobacco Comment  trying to quit under 6 cigrates a day          Alcohol History     Alcohol Use Status  Yes          Drug Use     Drug Use Status  Yes Types  Marijuana Comment  not on medical          Sexual Activity     Sexually Active  Not Currently Activities of Daily Living    Not Asked                     OBJECTIVE:     Mental Status Evaluation:    Appearance age appropriate, casually dressed   Behavior pleasant, cooperative   Speech normal volume, normal pitch   Mood Appropriate   Affect Full and appropriate   Thought Processes logical   Associations intact associations   Thought Content normal   Perceptual Disturbances: none   Abnormal Thoughts  Risk Potential Suicidal ideation - None  Homicidal ideation - None  Potential for aggression - No   Orientation oriented to person, place, time/date and situation   Memory recent and remote memory grossly intact   Cosciousness alert and awake   Attention Span attention span and concentration are age appropriate   Intellect Appears to be of Average Intelligence   Insight age appropriate    Judgement good    Muscle Strength and  Gait muscle strength and tone were normal   Language Intact   Fund of Knowledge Intact   Pain none   Pain Scale 0       Assessment/Plan:       Diagnoses and all orders for this visit:    Bipolar disorder, current episode depressed, severe, without psychotic features ([facility] [address] )          Treatment Recommendations/Precautions:    Continue current medications:    Risks/Benefits      Risks, Benefits And Possible Side Effects Of Medications:    Risks, benefits, and possible side effects of medications explained to patient and patient verbalizes understanding and agreement for treatment  Controlled Medication Discussion:   No records found for controlled prescriptions according to [address] [place] [address] Monitoring Program        Psychotherapy Provided:     Individual psychotherapy provided:   Today I spent 70 minutes counseling with Blu Basurto

## 2022-11-08 ENCOUNTER — SOCIAL WORK (OUTPATIENT)
Dept: BEHAVIORAL/MENTAL HEALTH CLINIC | Facility: CLINIC | Age: 42
End: 2022-11-08

## 2022-11-08 DIAGNOSIS — F31.4 BIPOLAR DISORDER, CURRENT EPISODE DEPRESSED, SEVERE, WITHOUT PSYCHOTIC FEATURES (HCC): Primary | ICD-10-CM

## 2022-11-08 DIAGNOSIS — F43.10 PTSD (POST-TRAUMATIC STRESS DISORDER): ICD-10-CM

## 2022-11-08 NOTE — PSYCH
Psychotherapy Provided: Individual Psychotherapy 45 minutes     Length of time in session: 45 minutes, follow up in 2 week    Encounter Diagnosis     ICD-10-CM    1  Bipolar disorder, current episode depressed, severe, without psychotic features ([facility] [address] )  F31 4    2  PTSD (post-traumatic stress disorder)  F43 10        Goals addressed in session: Goal 1 and Goal 2     Pain:  No    Current suicide risk : Low     D:  Bro Goldberg reported that he has been trying to be more positive but finds that he continues to get no respect at home and feels unloved and like a burden   He denied suicidal ideation plan or intent, stating he has been sleeping better albeit during the day, and has been talking to a  that has been helping  He also reported that he told his wife that if her children are not gone by Eduardo, as is the plan as of now, that he will need to consider leaving the marriage  Discussed the importance of taking responsibility for himself which he reported he is learning, but continues to expect others to change  Attempted to encourage Bro Goldberg to look at his positie quality of having a big heart and cultivating the plants in the garden that are open to his care rather than isolating himself completely which he discussed as feeling is his only option moving forward  Modalities used: Motivational interviewing, pattern dynamic reflection, direct influence, strength based  A:  Garcia presented with sad/irritable mood and full affect  He was engaged and open, with intermittent eye contact, oriented throughout  His thought processes were organized, speech was a normal rate and volume  He denied suicidal ideation, or thoughts to harm others  His insight and judgement were limited  P:  Bro Goldberg will continue with counseling  Behavioral Health Treatment Plan [facility]: Diagnosis and Treatment Plan explained to Jimmie Story relates understanding diagnosis and is agreeable to Treatment Plan   Yes     Visit start and stop times:    11/08/22  Start Time: 0800  Stop Time: 9211  Total Visit Time: 47 minutes

## 2022-11-22 ENCOUNTER — SOCIAL WORK (OUTPATIENT)
Dept: BEHAVIORAL/MENTAL HEALTH CLINIC | Facility: CLINIC | Age: 42
End: 2022-11-22

## 2022-11-22 DIAGNOSIS — F31.4 BIPOLAR DISORDER, CURRENT EPISODE DEPRESSED, SEVERE, WITHOUT PSYCHOTIC FEATURES (HCC): Primary | ICD-10-CM

## 2022-11-22 DIAGNOSIS — F41.1 GENERALIZED ANXIETY DISORDER: ICD-10-CM

## 2022-11-22 DIAGNOSIS — F43.10 PTSD (POST-TRAUMATIC STRESS DISORDER): ICD-10-CM

## 2022-11-22 NOTE — PSYCH
Psychotherapy Provided: Individual Psychotherapy 45 minutes     Length of time in session: 22 minutes, follow up in TBD    Encounter Diagnosis     ICD-10-CM    1  Bipolar disorder, current episode depressed, severe, without psychotic features ([facility] [address] )  F31 4       2  PTSD (post-traumatic stress disorder)  F43 10       3  Generalized anxiety disorder  F41 1           Goals addressed in session: Goal 1 and Goal 2     Pain:  Not assessed      Current suicide risk : Moderate    D:  Garcia arrived 13 minutes late to session which was rescheduled from this morning's 8 am no show  He sat with his deshpande up over his baseball cap  When asked how he was doing, Juan Storm reported that things were going ok, he was not letting things get to him, has been listening to music, going for walks and watching tv  He reported that he was avoiding people who make him angry or upset and spending time alone for the most part  He then reported that he ahd been spending time with friends from his past who had been laughing at the stories he was telling them about his traumatic experiences and wondered aloud if he could be a stand up comic  Juan Storm was asked if he was under the influence since when he was making eye contact, his eyes were opened very little and he smelled of marijuana  Juan Storm denied that he was under the influence which he denied  Juan Storm quoted several inspirational sayings  He reported that he was feeling less badly about himself since he has applied for SSD and will be bringing in some money and plans to work on things around the house so that he is contributing  Invited Juan Storm to begin to work on his anger issues explaining that avoidance is not sustainable and Juan Storm stated that bringing it up was making him angry  It was reflected back to him that he had come in due to anxiety, depression and anger issues and explained the process of therapy sometimes we talk about things that are uncomfortable but that lead to change    Juan Storm stated that he should not waste his time and fell silent  After a moment, Tequila Gracia got up, opened the door and stated that this was a waste of his time and not a reason to keep going into debt and left the office  A:  Garcia presented with labile mood and affect  He had limited insight and judgment  His thought processes were tangential at times, and there was no evidence of hallucinations  Tequila Gracia was impulsive and appeared under the influence  Tequila Gracia presents with moderate risk for suicide based on his past history, with minimal risk for harm to others  P:  Tequila Gracia will be sent a letter offering him to come back when he feels ready to discuss his mental health problems  Behavioral Health Treatment Plan [facility]: Diagnosis and Treatment Plan explained to Rosio Covington relates understanding diagnosis and is agreeable to Treatment Plan   Yes     Visit start and stop times:    11/22/22  Start Time: 1213  Stop Time: 1235  Total Visit Time: 22 minutes

## 2023-09-25 ENCOUNTER — DOCUMENTATION (OUTPATIENT)
Dept: BEHAVIORAL/MENTAL HEALTH CLINIC | Facility: CLINIC | Age: 43
End: 2023-09-25

## 2023-09-25 NOTE — PROGRESS NOTES
Psychotherapy Discharge Summary    Preferred Name: Dorothey Bumpers  YOB: 1980    Admission date to psychotherapy: 8/16/22    Referred by: PCP, Raghavendra Szymanski    Presenting Problem: Depression, anxiety, history of trauma, family issues, recent job loss. Course of treatment included : individual therapy  and referral to [facility]    Progress/Outcome of Treatment Goals (brief summary of course of treatment) Luca Douglas was seen a total of 4 times. Luca Douglas had difficulty managing his emotions and was referred to [facility] level of care, but was unable to attend for financial reasons. During our last session, it was suspected that Luca Douglas was under the influence, which he denied. He then left the session and did not return. Treatment Complications (if any): Suspected attendance to session under the influence. Treatment Progress: fair    Current [facility] Psychiatric Provider: none    Discharge Medications include: This writer does not prescribe or manage medications.     Discharge Date: 9/25/23    Discharge Diagnosis: Bipolar DO, current episode depressed, severe, without psychotic features; PTSD; SANTI    Criteria for Discharge: demonstrated failure to uphold their treatment plan/contract    Aftercare recommendations include (include specific referral names and phone numbers, if appropriate):   -Return to treatment when ready  -Call 911 if psychiatric emergency/suicidal thoughts  -Utilize 505 or the Talk to Text line for support by texting "Hello" to [phone]    Prognosis: fair

## 2023-11-28 ENCOUNTER — HOSPITAL ENCOUNTER (EMERGENCY)
Facility: HOSPITAL | Age: 43
End: 2023-11-29
Attending: EMERGENCY MEDICINE
Payer: COMMERCIAL

## 2023-11-28 ENCOUNTER — TELEPHONE (OUTPATIENT)
Dept: PSYCHIATRY | Facility: CLINIC | Age: 43
End: 2023-11-28

## 2023-11-28 DIAGNOSIS — R45.851 SUICIDAL IDEATION: ICD-10-CM

## 2023-11-28 DIAGNOSIS — Z00.8 ENCOUNTER FOR PSYCHOLOGICAL EVALUATION: Primary | ICD-10-CM

## 2023-11-28 DIAGNOSIS — F15.10 METHAMPHETAMINE ABUSE (HCC): ICD-10-CM

## 2023-11-28 LAB
ALBUMIN SERPL BCP-MCNC: 4.3 G/DL (ref 3.5–5)
ALP SERPL-CCNC: 54 U/L (ref 34–104)
ALT SERPL W P-5'-P-CCNC: 22 U/L (ref 7–52)
AMPHETAMINES SERPL QL SCN: POSITIVE
ANION GAP SERPL CALCULATED.3IONS-SCNC: 10 MMOL/L
AST SERPL W P-5'-P-CCNC: 23 U/L (ref 13–39)
BACTERIA UR QL AUTO: ABNORMAL /HPF
BARBITURATES UR QL: NEGATIVE
BASOPHILS # BLD AUTO: 0.03 THOUSANDS/ÂΜL (ref 0–0.1)
BASOPHILS NFR BLD AUTO: 0 % (ref 0–1)
BENZODIAZ UR QL: NEGATIVE
BILIRUB SERPL-MCNC: 0.61 MG/DL (ref 0.2–1)
BILIRUB UR QL STRIP: ABNORMAL
BUN SERPL-MCNC: 12 MG/DL (ref 5–25)
CALCIUM SERPL-MCNC: 9 MG/DL (ref 8.4–10.2)
CHLORIDE SERPL-SCNC: 108 MMOL/L (ref 96–108)
CLARITY UR: CLEAR
CO2 SERPL-SCNC: 22 MMOL/L (ref 21–32)
COCAINE UR QL: NEGATIVE
COLOR UR: ABNORMAL
CREAT SERPL-MCNC: 1.09 MG/DL (ref 0.6–1.3)
EOSINOPHIL # BLD AUTO: 0.1 THOUSAND/ÂΜL (ref 0–0.61)
EOSINOPHIL NFR BLD AUTO: 1 % (ref 0–6)
ERYTHROCYTE [DISTWIDTH] IN BLOOD BY AUTOMATED COUNT: 12.6 % (ref 11.6–15.1)
ETHANOL SERPL-MCNC: <10 MG/DL
FLUAV RNA RESP QL NAA+PROBE: NEGATIVE
FLUBV RNA RESP QL NAA+PROBE: NEGATIVE
GFR SERPL CREATININE-BSD FRML MDRD: 81 ML/MIN/1.73SQ M
GLUCOSE SERPL-MCNC: 112 MG/DL (ref 65–140)
GLUCOSE UR STRIP-MCNC: NEGATIVE MG/DL
HCT VFR BLD AUTO: 44.7 % (ref 36.5–49.3)
HGB BLD-MCNC: 15.6 G/DL (ref 12–17)
HGB UR QL STRIP.AUTO: NEGATIVE
IMM GRANULOCYTES # BLD AUTO: 0.01 THOUSAND/UL (ref 0–0.2)
IMM GRANULOCYTES NFR BLD AUTO: 0 % (ref 0–2)
KETONES UR STRIP-MCNC: ABNORMAL MG/DL
LEUKOCYTE ESTERASE UR QL STRIP: NEGATIVE
LYMPHOCYTES # BLD AUTO: 3.43 THOUSANDS/ÂΜL (ref 0.6–4.47)
LYMPHOCYTES NFR BLD AUTO: 49 % (ref 14–44)
MCH RBC QN AUTO: 31.3 PG (ref 26.8–34.3)
MCHC RBC AUTO-ENTMCNC: 34.9 G/DL (ref 31.4–37.4)
MCV RBC AUTO: 90 FL (ref 82–98)
METHADONE UR QL: NEGATIVE
MONOCYTES # BLD AUTO: 0.48 THOUSAND/ÂΜL (ref 0.17–1.22)
MONOCYTES NFR BLD AUTO: 7 % (ref 4–12)
MUCOUS THREADS UR QL AUTO: ABNORMAL
NEUTROPHILS # BLD AUTO: 3.05 THOUSANDS/ÂΜL (ref 1.85–7.62)
NEUTS SEG NFR BLD AUTO: 43 % (ref 43–75)
NITRITE UR QL STRIP: NEGATIVE
NON-SQ EPI CELLS URNS QL MICRO: ABNORMAL /HPF
NRBC BLD AUTO-RTO: 0 /100 WBCS
OPIATES UR QL SCN: NEGATIVE
OXYCODONE+OXYMORPHONE UR QL SCN: NEGATIVE
PCP UR QL: NEGATIVE
PH UR STRIP.AUTO: 6 [PH]
PLATELET # BLD AUTO: 331 THOUSANDS/UL (ref 149–390)
PMV BLD AUTO: 8.6 FL (ref 8.9–12.7)
POTASSIUM SERPL-SCNC: 3.5 MMOL/L (ref 3.5–5.3)
PROT SERPL-MCNC: 7.7 G/DL (ref 6.4–8.4)
PROT UR STRIP-MCNC: ABNORMAL MG/DL
RBC # BLD AUTO: 4.98 MILLION/UL (ref 3.88–5.62)
RBC #/AREA URNS AUTO: ABNORMAL /HPF
RSV RNA RESP QL NAA+PROBE: NEGATIVE
SARS-COV-2 RNA RESP QL NAA+PROBE: NEGATIVE
SODIUM SERPL-SCNC: 140 MMOL/L (ref 135–147)
SP GR UR STRIP.AUTO: >=1.03 (ref 1–1.03)
THC UR QL: POSITIVE
UROBILINOGEN UR QL STRIP.AUTO: 1 E.U./DL
WBC # BLD AUTO: 7.1 THOUSAND/UL (ref 4.31–10.16)
WBC #/AREA URNS AUTO: ABNORMAL /HPF

## 2023-11-28 PROCEDURE — 99285 EMERGENCY DEPT VISIT HI MDM: CPT | Performed by: EMERGENCY MEDICINE

## 2023-11-28 PROCEDURE — 85025 COMPLETE CBC W/AUTO DIFF WBC: CPT | Performed by: EMERGENCY MEDICINE

## 2023-11-28 PROCEDURE — 36415 COLL VENOUS BLD VENIPUNCTURE: CPT | Performed by: EMERGENCY MEDICINE

## 2023-11-28 PROCEDURE — 80053 COMPREHEN METABOLIC PANEL: CPT | Performed by: EMERGENCY MEDICINE

## 2023-11-28 PROCEDURE — 82077 ASSAY SPEC XCP UR&BREATH IA: CPT | Performed by: EMERGENCY MEDICINE

## 2023-11-28 PROCEDURE — 80307 DRUG TEST PRSMV CHEM ANLYZR: CPT | Performed by: EMERGENCY MEDICINE

## 2023-11-28 PROCEDURE — 81001 URINALYSIS AUTO W/SCOPE: CPT | Performed by: EMERGENCY MEDICINE

## 2023-11-28 PROCEDURE — 99283 EMERGENCY DEPT VISIT LOW MDM: CPT

## 2023-11-28 PROCEDURE — 0241U HB NFCT DS VIR RESP RNA 4 TRGT: CPT | Performed by: EMERGENCY MEDICINE

## 2023-11-28 RX ORDER — LORAZEPAM 1 MG/1
1 TABLET ORAL ONCE
Status: COMPLETED | OUTPATIENT
Start: 2023-11-28 | End: 2023-11-28

## 2023-11-28 RX ADMIN — LORAZEPAM 1 MG: 1 TABLET ORAL at 15:30

## 2023-11-28 NOTE — ED NOTES
Pt requesting room with a door "Or not in the middle of the ER I feel like everyone is watching me! I am a vet please respect that!" Charge nurse aware. Pt moved to Room 21. Pt redirectable. Pt remains tearful but reports feeling more comfortable in new room. 1:1 remains with continuous visual monitoring for SI. Report given to [address] [place] [address]. Pt resting comfortable on L side in room. Denies complaints or requests at this time.       Phoenix Benito RN  11/28/23 [id number]
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
## 2023-11-28 NOTE — ED NOTES
Pt is resting in bed, eye closed. Resp is non labored. Self repositioned in bed. Per PES, pt is wanting to go to inpatient hospitalization. [facility] has bed, awaiting UA to result, another 10 mins per lab. Secure holding monitor continues with Cecelia.       Janet Quiroz RN  11/28/23 8505

## 2023-11-28 NOTE — ED NOTES
Received pt, ambulates to secure holding 21 steady gait escorted by RN and security. Pt is extremely anxious, crying but apologetic about his behavior. Pt was given po ativan, will re-assess the pt when he is calmer. Secure holding monitor continues with Cecelia.        Maia Canchola RN  11/28/23 0749

## 2023-11-28 NOTE — TELEPHONE ENCOUNTER
Patient called crying and sounding desperate, he was a former patient of [address] [facility],  [address], I checked with the practice administrator and was told unfortunately he would have to go back on the wait list. Writer advised the patient to go to the ER because he was crying and stated he didn't know what else to do. Writer told the patient to give us a call once he was treated at the ER and felt better so we could put him on the wait list. Patient stated he would go to the ER.

## 2023-11-28 NOTE — ED NOTES
15:30 - 201 signed and faxed to [facility] for a [facility] bed tomorrow. Patient would prefer a sooner transfer if possible - 16:00 - called [facility] - they reported having beds available. 18:25 - chart faxed to [facility] - called to verify receipt. 20:15 - called [facility] for an update - "still waiting for review". 21:15 - accepted to [facility] by Dr Cole Lloyd (ER advised covid needed) - Rn report - call [phone] - O for  and ask for unit: "[facility]". 21:30 - called [address] [place] [address] @ [phone] [facility] Mgr was Lanna Runner - 3 day authorization, 11/29 - 12/1/23 with concurrent review on 12/2/23 with Jeremy WILKINSON @ [phone]; authorization#: [id number].    22:00 - Round-trip ordered - SD[facility] @ 10:30 (11/29/23)    Cancelled referral with [facility]. This note with covid results faxed to [address] [place] [address] @ 22:30. Patient provided an update.
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
## 2023-11-29 VITALS
SYSTOLIC BLOOD PRESSURE: 133 MMHG | HEIGHT: 66 IN | RESPIRATION RATE: 18 BRPM | TEMPERATURE: 97.5 F | DIASTOLIC BLOOD PRESSURE: 90 MMHG | BODY MASS INDEX: 29.41 KG/M2 | HEART RATE: 83 BPM | WEIGHT: 183 LBS | OXYGEN SATURATION: 98 %

## 2023-11-29 NOTE — ED NOTES
Pt awake, pt upset and asking for phone. Pt calm and cooperative with staff when provided phone.      Neelima Fernandez RN  11/29/23 3079

## 2023-11-29 NOTE — EMTALA/ACUTE CARE TRANSFER
[address] [address] [address]  [address] [address]  Dept: [phone]      EMTALA TRANSFER CONSENT    NAME Alfie Araiza                                         1980                              MRN [id number]    I have been informed of my rights regarding examination, treatment, and transfer   by Dr. Ankush Perdomo DO    Benefits: Specialized equipment and/or services available at the receiving facility (Include comment)________________________    Risks:        Consent for Transfer:  I acknowledge that my medical condition has been evaluated and explained to me by the emergency department physician or other qualified medical person and/or my attending physician, who has recommended that I be transferred to the service of  Accepting Physician: Dr Vicenta Solorzano at [address] [facility] [address] Name, [address] [facility] : [address] [place] [address] Road. The above potential benefits of such transfer, the potential risks associated with such transfer, and the probable risks of not being transferred have been explained to me, and I fully understand them. The doctor has explained that, in my case, the benefits of transfer outweigh the risks. I agree to be transferred. I authorize the performance of emergency medical procedures and treatments upon me in both transit and upon arrival at the receiving facility. Additionally, I authorize the release of any and all medical records to the receiving facility and request they be transported with me, if possible. I understand that the safest mode of transportation during a medical emergency is an ambulance and that the Hospital advocates the use of this mode of transport. Risks of traveling to the receiving facility by car, including absence of medical control, life sustaining equipment, such as oxygen, and medical personnel has been explained to me and I fully understand them.     (LYSSA CORRECT BOX BELOW)  [  ]  I consent to the stated transfer and to be transported by ambulance/helicopter. [  ]  I consent to the stated transfer, but refuse transportation by ambulance and accept full responsibility for my transportation by car. I understand the risks of non-ambulance transfers and I exonerate the Hospital and its staff from any deterioration in my condition that results from this refusal.    X___________________________________________    DATE  23  TIME________  Signature of patient or legally responsible individual signing on patient behalf           RELATIONSHIP TO PATIENT_________________________          Provider Certification    NAME Deepak Alexandra                                         1980                              MRN [id number]    A medical screening exam was performed on the above named patient. Based on the examination:    Condition Necessitating Transfer The primary encounter diagnosis was Encounter for psychological evaluation. Diagnoses of Suicidal ideation and Methamphetamine abuse ([address]) were also pertinent to this visit. Patient Condition: The patient has been stabilized such that within reasonable medical probability, no material deterioration of the patient condition or the condition of the unborn child(georgi) is likely to result from the transfer    Reason for Transfer: Level of Care needed not available at this facility    Transfer Requirements: [address] available and qualified personnel available for treatment as acknowledged by    Agreed to accept transfer and to provide appropriate medical treatment as acknowledged by       Dr Crystal Saavedra  Appropriate medical records of the examination and treatment of the patient are provided at the time of transfer   [address] _______  Transfer will be performed by qualified personnel from    and appropriate transfer equipment as required, including the use of necessary and appropriate life support measures.     Provider Certification: I have examined the patient and explained the following risks and benefits of being transferred/refusing transfer to the patient/family:  General risk, such as traffic hazards, adverse weather conditions, rough terrain or turbulence, possible failure of equipment (including vehicle or aircraft), or consequences of actions of persons outside the control of the transport personnel      Based on these reasonable risks and benefits to the patient and/or the unborn child(georgi), and based upon the information available at the time of the patient’s examination, I certify that the medical benefits reasonably to be expected from the provision of appropriate medical treatments at another medical facility outweigh the increasing risks, if any, to the individual’s medical condition, and in the case of labor to the unborn child, from effecting the transfer.     X____________________________________________ DATE 11/28/23        TIME_______      ORIGINAL - SEND TO MEDICAL RECORDS   COPY - SEND WITH PATIENT DURING TRANSFER

## 2023-12-04 NOTE — TELEPHONE ENCOUNTER
Due to pt's d/c note from [facility] on 9/25, pt would be best seen through a partial program and is not a good fit for OP services due to possibly under the influence at last session. Pt is taken off of wait list at this time.

## 2023-12-13 ENCOUNTER — OFFICE VISIT (OUTPATIENT)
Dept: FAMILY MEDICINE CLINIC | Facility: CLINIC | Age: 43
End: 2023-12-13
Payer: COMMERCIAL

## 2023-12-13 ENCOUNTER — TELEPHONE (OUTPATIENT)
Age: 43
End: 2023-12-13

## 2023-12-13 ENCOUNTER — TRANSITIONAL CARE MANAGEMENT (OUTPATIENT)
Dept: FAMILY MEDICINE CLINIC | Facility: CLINIC | Age: 43
End: 2023-12-13

## 2023-12-13 VITALS
DIASTOLIC BLOOD PRESSURE: 84 MMHG | SYSTOLIC BLOOD PRESSURE: 136 MMHG | WEIGHT: 198.2 LBS | HEIGHT: 66 IN | HEART RATE: 81 BPM | RESPIRATION RATE: 18 BRPM | BODY MASS INDEX: 31.85 KG/M2 | TEMPERATURE: 98.1 F

## 2023-12-13 DIAGNOSIS — F31.81 BIPOLAR 2 DISORDER (HCC): Primary | ICD-10-CM

## 2023-12-13 DIAGNOSIS — R82.5 POSITIVE URINE DRUG SCREEN: ICD-10-CM

## 2023-12-13 PROCEDURE — 99496 TRANSJ CARE MGMT HIGH F2F 7D: CPT | Performed by: NURSE PRACTITIONER

## 2023-12-13 RX ORDER — DIVALPROEX SODIUM 500 MG/1
500 TABLET, EXTENDED RELEASE ORAL 2 TIMES DAILY
COMMUNITY
End: 2023-12-13 | Stop reason: SDUPTHER

## 2023-12-13 RX ORDER — RISPERIDONE 2 MG/1
2 TABLET ORAL 2 TIMES DAILY
Qty: 30 TABLET | Refills: 0 | Status: SHIPPED | OUTPATIENT
Start: 2023-12-13 | End: 2023-12-28

## 2023-12-13 RX ORDER — DIVALPROEX SODIUM 500 MG/1
500 TABLET, EXTENDED RELEASE ORAL 2 TIMES DAILY
Qty: 30 TABLET | Refills: 0 | Status: SHIPPED | OUTPATIENT
Start: 2023-12-13 | End: 2023-12-28

## 2023-12-13 NOTE — PATIENT INSTRUCTIONS
Bipolar Disorder   WHAT YOU NEED TO KNOW:   Bipolar disorder is a long-term chemical imbalance that causes rapid changes in mood and behavior. High moods are called jamarcus. Low moods are called depression. Sometimes you will feel manic and sometimes you will feel depressed. You can have alternating episodes of jamarcus and depression. This is called a mixed bipolar state. DISCHARGE INSTRUCTIONS:   Contact your healthcare provider or psychiatrist if:   You are having trouble managing your bipolar disorder. You cannot sleep, or are sleeping all the time. You cannot eat, or are eating more than usual.    You feel dizzy or your stomach is upset. You cannot make it to your next meeting with your healthcare provider. You have questions or concerns about your condition or care. Medicines:   Medicines  may be given to help keep your mood stable, or to help you sleep. Changes in medicine are often needed as your bipolar disorder changes. Take your medicine as directed. Contact your healthcare provider if you think your medicine is not helping or if you have side effects. Tell your provider if you are allergic to any medicine. Keep a list of the medicines, vitamins, and herbs you take. Include the amounts, and when and why you take them. Bring the list or the pill bottles to follow-up visits. Carry your medicine list with you in case of an emergency. Follow up with your healthcare provider or psychiatrist as directed: You may need to return for blood tests to monitor the levels of bipolar medicine in your blood. Manage bipolar disorder:  Watch for triggers of bipolar disorder symptoms, such as stress. Learn new ways to relax, such as deep breathing, to manage your stress. Tell someone if you feel a manic or depressive period might be coming on. Ask a friend or family member to help watch you for bipolar symptoms. Work to develop skills that will help you manage bipolar disorder.  You may need to make lifestyle changes. Ask your healthcare provider or psychiatrist for resources. For support and more information:   [address] [employer] ([address]), Office of Public [employer] and [employer]  [address] [place] [address], [address], [address] [place] [phone]   Phone: [phone]  Phone: [phone]  Web Address: [email or web address]    Depression and [address] [place] [address] ([address] [facility])  [facility], [facility] , [address]  Phone: [phone]  Web Address: [employer].. org   © Copyright [facility] 2023 Information is for End User's use only and may not be sold, redistributed or otherwise used for commercial purposes. The above information is an  only. It is not intended as medical advice for individual conditions or treatments. Talk to your doctor, nurse or pharmacist before following any medical regimen to see if it is safe and effective for you.

## 2023-12-13 NOTE — PROGRESS NOTES
Assessment/Plan:    1. Bipolar 2 disorder (HCC)  -     risperiDONE (RisperDAL) 2 mg tablet; Take 1 tablet (2 mg total) by mouth 2 (two) times a day for 15 days  -     divalproex sodium (DEPAKOTE ER) 500 mg 24 hr tablet; Take 1 tablet (500 mg total) by mouth 2 (two) times a day for 15 days  -     Ambulatory referral to [employer]; Future    2. Positive urine drug screen          Patient Instructions:  Supportive care discussed and advised. Advised to RTO for any worsening and no improvement. Follow up for no improvement and worsening of conditions. Patient advised and educated when to see immediate medical care. Return if symptoms worsen or fail to improve. No future appointments. Subjective:      Patient ID: Isaura Cochran is a 37 y.o. male. Chief Complaint   Patient presents with   • Transition of [address] 1826 f/u Encounter for psychological evaluation Primo Navarrete LPN           Vitals:  /84   Pulse 81   Temp 98.1 °F (36.7 °C)   Resp 18   Ht 5' 5.5" (1.664 m)   Wt 89.9 kg (198 lb 3.2 oz)   BMI 32.48 kg/m²     HPI  Patient is here to follow up after recent hospitalization. Patient was recently hospitalized as stated that been cutting his meds in half as running out of them and has not seen psychiatrist since oct 2022. Patient was positive for THC and amphetamine in urine test and stated that does smokes marijuana but denies amphetamine intake and stated that it is positive due to red bull drinks as he drinks them. Patient stated that out of work since thanksgiving. I informed patient that he needs to follow with psychiatrist ASAP and will only give him short term supply of medications for 2 weeks and will not do any disability paperwork that no forms of this nature would be promised or completed.   This was explained clearly to the patient's understanding as needs to be done by psychiatrist as out of work due to psychiatric illness and not medical illness . Patient will also try to schedule at [facility]. TCM Call     Date and time call was made  12/13/2023  9:49 AM    Hospital care reviewed  Records reviewed    Patient was hospitialized at  Other (comment)    Comment  [facility] (psych)    Date of Admission  11/27/23    Date of discharge  12/01/23    Disposition  Home    Were the patients medications reviewed and updated  Yes    Current Symptoms  None      TCM Call     Post hospital issues  None    Should patient be enrolled in anticoag monitoring? No    Scheduled for follow up? Yes    Patients specialists  Psychiatrist    Did you obtain your prescribed medications  Yes    Do you need help managing your prescriptions or medications  No    Is transportation to your appointment needed  No    I have advised the patient to call PCP with any new or worsening symptoms  [address] [place] [address] or [facility] other    Support System  None    Are you recieving any outpatient services  No    Are you recieving home care services  No    Are you using any community resources  No    Current waiver services  No    Have you fallen in the last 12 months  No    Interperter language line needed  No    Counseling  Patient    Counseling topics  Prognosis                  The following portions of the patient's history were reviewed and updated as appropriate: allergies, current medications, past family history, past medical history, past social history, past surgical history and problem list.      Review of Systems   HENT: Negative. Respiratory: Negative. Cardiovascular: Negative. Gastrointestinal: Negative.     Psychiatric/Behavioral:          As noted in HPI             Objective:    Social History     Tobacco Use   Smoking Status Every Day   • Current packs/day: 0.50   • Types: Cigarettes   Smokeless Tobacco Never   Tobacco Comments    trying to quit under 6 cigrates a day       Allergies: No Known Allergies      Current Outpatient Medications   Medication Sig Dispense Refill   • divalproex sodium (DEPAKOTE ER) 500 mg 24 hr tablet Take 1 tablet (500 mg total) by mouth 2 (two) times a day for 15 days 30 tablet 0   • risperiDONE (RisperDAL) 2 mg tablet Take 1 tablet (2 mg total) by mouth 2 (two) times a day for 15 days 30 tablet 0   • VITAMIN D PO Take by mouth once a week       No current facility-administered medications for this visit. Physical Exam  Constitutional:       Appearance: Normal appearance. HENT:      Head: Normocephalic. Nose: Nose normal.   Eyes:      Conjunctiva/sclera: Conjunctivae normal.   Cardiovascular:      Rate and Rhythm: Normal rate and regular rhythm. Heart sounds: Normal heart sounds. Pulmonary:      Effort: Pulmonary effort is normal.      Breath sounds: Normal breath sounds. Musculoskeletal:         General: No swelling or tenderness. Normal range of motion. Skin:     General: Skin is warm and dry. Findings: No rash. Neurological:      Mental Status: He is alert and oriented to person, place, and time. Psychiatric:         Mood and Affect: Mood normal.         Behavior: Behavior normal.         Thought Content:  Thought content normal.         Judgment: Judgment normal.                     ANIRUDH Pedersen

## 2023-12-13 NOTE — TELEPHONE ENCOUNTER
Patients wife called in stating he was in the hospital and was recently discharged from behavioral health. Patient needs a follow-up to discuss medications because he cannot be without the new medications that were prescribed. Please advise.

## 2023-12-14 ENCOUNTER — TELEPHONE (OUTPATIENT)
Dept: PSYCHIATRY | Facility: CLINIC | Age: 43
End: 2023-12-14

## 2023-12-14 NOTE — TELEPHONE ENCOUNTER
Patient called and was given the information in his chart about the  additional resources at [address] [place] [address]. Patient told writer that he took the advice given to him the first time he called and went to the ER where he was admitted, [facility]. He was discharged and his PCP put a referral in for our office. He stated that he knows  about [facility] but unfortunately does not have a vehicle to get there. He was not happy with the out come and thanked writer and then hung up.

## 2024-01-05 ENCOUNTER — TELEPHONE (OUTPATIENT)
Dept: PSYCHOLOGY | Facility: CLINIC | Age: 44
End: 2024-01-05

## 2024-01-05 NOTE — TELEPHONE ENCOUNTER
Pt had cancelled last time PHP appointments due to covid. Returned his call today and keeping him in another two weeks wait list for PHP.

## 2024-01-10 ENCOUNTER — OFFICE VISIT (OUTPATIENT)
Dept: PSYCHIATRY | Facility: CLINIC | Age: 44
End: 2024-01-10
Payer: COMMERCIAL

## 2024-01-10 ENCOUNTER — OFFICE VISIT (OUTPATIENT)
Dept: PSYCHOLOGY | Facility: CLINIC | Age: 44
End: 2024-01-10
Payer: COMMERCIAL

## 2024-01-10 VITALS
HEIGHT: 66 IN | WEIGHT: 202 LBS | BODY MASS INDEX: 32.47 KG/M2 | DIASTOLIC BLOOD PRESSURE: 92 MMHG | SYSTOLIC BLOOD PRESSURE: 152 MMHG | HEART RATE: 106 BPM

## 2024-01-10 DIAGNOSIS — F17.200 TOBACCO USE DISORDER: ICD-10-CM

## 2024-01-10 DIAGNOSIS — F12.90 MARIJUANA USE, CONTINUOUS: ICD-10-CM

## 2024-01-10 DIAGNOSIS — F43.10 PTSD (POST-TRAUMATIC STRESS DISORDER): ICD-10-CM

## 2024-01-10 DIAGNOSIS — F15.21 METHAMPHETAMINE USE DISORDER, SEVERE, IN EARLY REMISSION (HCC): ICD-10-CM

## 2024-01-10 DIAGNOSIS — F31.9 BIPOLAR 1 DISORDER (HCC): Primary | ICD-10-CM

## 2024-01-10 PROBLEM — F43.12 POST-TRAUMATIC STRESS DISORDER, CHRONIC: Status: ACTIVE | Noted: 2021-12-02

## 2024-01-10 PROBLEM — Z72.0 TOBACCO USE: Status: ACTIVE | Noted: 2022-02-23

## 2024-01-10 PROCEDURE — G0410 GRP PSYCH PARTIAL HOSP 45-50: HCPCS

## 2024-01-10 PROCEDURE — 90792 PSYCH DIAG EVAL W/MED SRVCS: CPT | Performed by: PSYCHIATRY & NEUROLOGY

## 2024-01-10 PROCEDURE — 90834 PSYTX W PT 45 MINUTES: CPT

## 2024-01-10 PROCEDURE — G0176 OPPS/PHP;ACTIVITY THERAPY: HCPCS

## 2024-01-10 PROCEDURE — 96127 BRIEF EMOTIONAL/BEHAV ASSMT: CPT | Performed by: PSYCHIATRY & NEUROLOGY

## 2024-01-10 PROCEDURE — 90791 PSYCH DIAGNOSTIC EVALUATION: CPT

## 2024-01-10 RX ORDER — DIVALPROEX SODIUM 500 MG/1
1000 TABLET, EXTENDED RELEASE ORAL
Qty: 60 TABLET | Refills: 0 | Status: SHIPPED | OUTPATIENT
Start: 2024-01-10 | End: 2024-02-09

## 2024-01-10 RX ORDER — RISPERIDONE 2 MG/1
2 TABLET ORAL 2 TIMES DAILY
Qty: 60 TABLET | Refills: 0 | Status: SHIPPED | OUTPATIENT
Start: 2024-01-10 | End: 2024-02-09
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
## 2024-01-10 NOTE — PSYCH
Subjective:     Patient ID: Garcia Cuevas is a 43 y.o. male.    [facility] Clinical Progress Notes      Specialized Services Documentation  Therapist must complete separate progress note for each specific clinical activity in which the individual participated during the day.     Other Precert submitted on [employer] by Lance Bennett LCSW. Reference # [id number].    Tx plan will be reviewed and signed tomorrow.    Case Management Note    Kt Price, MS    Current suicide risk : Low     [id number] INTAKE  This writer met with Garcia Cuevas to review program expectations/schedule, fill out and sign ROIs, and acquire the standard intake information. Please see this writer's initial evaluation note for more information.     Medications changes/added/denied? See note by Dr. Aviles dated 01/10/24    Treatment session number: 1    Individual Case Management Visit provided today? Yes     [facility] follow up physician's orders: Admit to PHP today 01/10/24
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
## 2024-01-10 NOTE — PSYCH
Subjective:     Patient ID: Garcia Cuevas 43 y.o. Male    [facility] Clinical Progress Notes      Specialized Services Documentation  Therapist must complete separate progress note for each specific clinical activity in which the individual participated during the day.     Education Therapy     0213-0323 Garcia Cuevas  actively shared in morning assessment and goal review. Presented as Receptive related to readiness to learn.  Garcia Cuevas did not complete goal from last treatment day, however, he realized what he needs to do in order to reach his goal. did not present with any barriers to learning.     [id number] Garcia Cuevas engaged throughout the treatment day. Was engaged in learning related to Illness, Medication, Aftercare and Wellness Tools.  Staff utilized verbal, written, and demonstration teaching methods. Garcia Cuevas shared area of learning and set a goal for outside of program to learn to filter.     Tx Plan Objective: 1.1, 1.2, 1.4, Therapist:  BRANDI Bond     Group Psychotherapy - Boundaries II     [id number] Garcia Cuevas  was attentive throughout in a follow up psychoeducational group surround personal boundaries and visualizing one's boundaries. Group facilitator briefly reviewed Rigid, Porous, and Healthy Boundaries and the different types of boundaries: Physical, Mental/Emotional, Sexual, Financial, Intellectual, Time, and Non-negotiable boundaries. Group facilitator then led the group in an activity to help visualize one's limits. This was used as an exercise to help make boundaries more concrete. After, group facilitator opened the discussion for self reflection and shared tips for setting healthy boundaries:  Know your limits  Know your values  Listen to your emotions  Have self-respect  Have respect for others  Be assertive  Consider the long view   Lastly, the group was provided with the Law of Boundaries which are important to having successful relationships and dealing  with life well.  used the following structure to support and lead the group worksheets, open discussion/processing, peer support, and psychoeducation. some  progress as evidenced by engaged nonverbally as evidenced by taking notes, maintaining eye contact, open body language, and overall attentiveness. Garcia also voluntarily shared at times. Continue with psychoeducation  and encouragement of implementation to further strengthen their ability to identify, set, and maintain boundaries within self and fostering healthier relationships.    Tx Plan Objective 1.1, 1.2, 1.4 Therapist: BRANDI Bond and Lance Bennett LCSW

## 2024-01-10 NOTE — BH TREATMENT PLAN
Assessment/Plan:          Diagnoses and all orders for this visit:    Bipolar 1 disorder (HCC)    PTSD (post-traumatic stress disorder)    Marijuana use, continuous    Tobacco use disorder    Methamphetamine use disorder, severe, in early remission (HCC)           Subjective:      Patient ID: Garcia Cuevas  is a 43 y.o. male .     [facility] Treatment Plan   AREAS OF NEED: Garcia has been struggling with symptoms related to bipolar I and PTSD as evidenced by recent anger outbursts, poor sleep, reduced appetite, and feelings of guilt/shame due to lack of mental health treatment/past traumas/recent stressors.   Date Initiated: 01/10/24     Strengths: Helping others     LONG TERM GOAL:   Date Initiated: 01/10/24  1.0 While at [facility], I will gain insights/supports/education/skills which I can utilize daily to reduce my symptoms and increase my quality of life.  Target Date: 02/07/24  Completion Date:         SHORT TERM OBJECTIVES:      Date Initiated: 01/10/24  1.1 I will learn and practice at least 3 different ways to present and reinforce boundaries. (ie DEAR MAN acronym, GIVE acronym, FAST acronym, Wise Mind, etc.)  Revision Date:   Target Date: 01/19/24  Completion Date:      Date Initiated: 01/10/24  1.2 I will learn and practice at least 3 new/effective coping skills which I can use as often as necessary to improve the ways in which I express myself. (ie STOP acronym, Cognitive Distortion work, CBT thought records, journaling, TIPP acronym, Emotional Freedom Technique, etc.)  Revision Date:   Target Date:  01/19/24  Completion Date:     Date Initiated: 01/10/24  1.3 I will take medications as prescribed and share questions and concerns if they arise.    Revision Date:   Target Date:  01/19/24  Completion Date:      Date Initiated: 01/10/24  1.4 I will identify 3 ways my supports can assist in my recovery and agree to use them when/if needed.    Revision Date:   Target Date:  01/19/24  Completion Date:             7 DAY REVISION:     Date Initiated:   1.5   Revision Date:   Target Date:  Completion Date:           PSYCHIATRY:  Date Initiated: 01/10/24  Medication Management and Education       Revision Date:       The person(s) responsible for carrying out the plan is Carlota Staton MD & ANIRUDH Au     NURSING/SYMPTOM EDUCATION:  Date Initiated: 01/10/24      1.1, 1.2. 1.3, 1.4 Provide wellness/symptoms and skill education groups three to five days weekly to educate Garcia Cuevas on signs and symptoms of diagnoses, skills to manage stressors, and medication questions that will be addressed by the treatment team.        Revision date:              The person(s) responsible for carrying out the plan is Kt Price MS & Pat Flynn RN    PSYCHOLOGY:   Date Initiated: 01/10/24       1.1, 1.2, 1.4 Provide psychotherapy group 5 times per week to allow opportunity for Garcia Cuevas  to explore stressors and ways of coping.   Revision Date:      The person(s) responsible for carrying out the plan is BRANDI Morrison & KARI Dorsey     ALLIED THERAPY:   Date Initiated: 01/10/24  1.1,1.2 Engage Garcia Cuevas in AT group 5 times daily to encourage development and use of wellness tools to decrease symptoms and promote recovery through meaningful activity.  Revision Date:          The person(s) responsible for carrying out the plan is BETTY Vela & BETTY Lozano     CASE MANAGEMENT:   Date Initiated: 01/10/24      1.0 This  will meet with Garcia Cuevas  3-4 times weekly to assess treatment progress, discharge planning, connection to community supports and UR as indicated.  Revision Date:    The person(s) responsible for carrying out the plan is Kt Price MS     TREATMENT REVIEW/COMMENTS:      DISCHARGE CRITERIA: Identify 3 signs of progress and complete relapse prevention plan.    DISCHARGE PLAN: Connect with identified outpatient providers.   Estimated  Length of Stay: 10 treatment days          Diagnosis and Treatment Plan explained to   Garcia Cuevas   . Garcia A Cuevas  relates understanding diagnosis and is agreeable to Treatment Plan.         CLIENT COMMENTS / Please share your thoughts, feelings, need and/or experiences regarding your treatment plan with Staff.  Please see follow up note with comments.      Signatures can be found on [facility] Treatment plan consent form.

## 2024-01-10 NOTE — PSYCH
Subjective:      Patient ID:+Garcia Cuevas 43 y.o. male     [facility] Clinical Progress Notes       Specialized Services Documentation  Therapist must complete separate progress note for each specific clinical activity in which the individual participated during the day.         Education Therapy        Time 1230 to 1330  - Garcia Cuevas attended the psychoeducation group on Motivation.  Group members were educated by this writer on Motivation - the definition, the importance of and the barriers to same. In addition, group members were encouraged to define and share what motivation is to them and was to increase motivation. Group members choose a goal and then planned necessary steps to reach the goal, anticipate roadblocks to reaching the goal and ways to work past same and eventually complete the goal. This writer encouraged the members of group to continue utilizing the skills learned to apply to future goals to increase motivation.         Garcia Cuevas displayed understanding through engagement in the topic with the group . For Garcia Cuevas,  GOOD progress toward goals was noted, through their engagement in group. Continue psychotherapy to encourage self-awareness and home practice of skills to support wellness.    Treatment Plan Objective 1.1, 1.2, 1.3, 1.4 Therapist: Pat Flynn RN

## 2024-01-10 NOTE — PATIENT INSTRUCTIONS
Tips for Good Sleep     Start with basic sleep hygiene:  1. Life can be hectic, but do your best to honor the fundamental rhythmic infrastructure of life:    Regular bed and rising time, obtaining exposure to early morning light and evening dim light    Regular times for meals and exercise.    Don't nap. Although napping is known to offer a number of medical and psychological benefits, it is contraindicated with insomnia.     2.  Reduce 'body noise':    Manage caffeine, nicotine, alcohol and other drugs. Alcohol should never be taken as a sleep aid because it suppresses deep & REM (dreaming) sleep which are necessary for health.    Given its substantial half-life, standard cautions about caffeine may not be sufficiently conservative for many. Avoid all caffeine and other stimulant use after noon.      3. Reduce mind noise:     Anxiety and depression, high stress and unresolved emotions all are common causes of disturbed sleep (insomnia). Talk to your doctor about further treatment of these, including seeing a therapist to assist resolving unprocessed emotions.     Use the Bedroom (or at least the bed) for sleep & sex only. Don't have a TV in the bedroom, and don't work in the bedroom     Go to bed only when sleepy.         Develop a consistent bedtime ritual to facilitate letting go of wakefulness, e.g. warm bath, journaling (excellent to process emotions), restful practices that induce relation response such as relaxation exercises. Headspace Premium version has a 30 part healthy sleep module set and a nice sleep session.      Turn lights down low an hour prior to bedtime. If bright lights are unavoidable in the evening, use luz marina/orange glasses to block melatonin-suppressing blue light for the 1 - 2 hrs. prior to bedtime.      Keep evening entertainment light. Don't watch the evening news if upsetting. Light comedy is ok. Avoid 'post-dramatic' stress disorder such as watching scary movies and news     Emphasize  "the key process of letting go or surrender in sleep onset. In the end, we cannot finagle sleep. We can set the stage and be receptive to it, but we cannot intentionally \"go to sleep.\" Efforts to do so typically backfire   .    4. Reduce bedroom noise:    Healthy sleep environment: Bedroom cool (~ 68 F), completely dark, quiet,   psychologically safe and as environmentally 'green' as is feasible. Dusk simulation by dimming lights 1 - 3 hrs. prior to bedtime.      HEPA filtration & houseplants, organic and non-   toxic bedding.    Avoid clock watching - position the clock away from the bed       3. Regular exercise is critical and encourages deep sleep    However, avoid aerobics at least 3 - 4 hours prior to bed because it raises core temperature, which can interfere with sleep.     5. Nutrition:    Avoid high glycemic (like processed bakery goods & snacks) and harder to digest foods as bedtime snacks. Avoid GERD (heartburn)  aggravating foods. As an alternative, consider complex carbohydrates that may help transport tryptophan, a precursor to melatonin, across the blood-brain barrier.   Calcium, Mg, B-complex support sleep    Other sleep aids:   Sleep masks can help to keep dark. Also comforting   Ear plugs   White noise: fans and humidifiers,   'Sleep Bug' and other apps   Brain wave entrainment      6. Supplements that support sleep     Botanicals alone or in combination like valerian, lemon balm, lavender, chamomile (caution, some diuretic effect), and/or hops. A good brand is 'Phytocalm' by [employer]   Valerian ( Valeriana officinalis ) root  Requires 2 - 3 weeks to work. Dose: 1-3 grams crude root or 800-1200 mg of an extract standardized to 0.8-1.0% valerenic acid taken 30-60 minutes   prior to bedtime   More for chronic insomnia. Can be used as part of wean off benzos. .    Hops ( Humulus lupulus) Commission E approved for anxiety and sleep disturbance Dose is 300-500 mg before bed either in " capsule or tincture.       Melatonin is useful in aging populations and/or with circadian   irregularities. Always couple supplementation with other sleep hygiene recommendations. Dose: Adults: 0.25-0.5 mg SL sustained-release: ( not the common 3 mg tab). SL bypasses first pass liver metabolism & has more reliable levels. Take at ~ 8PM rather than at bedtime   ~ 11 PM. Not a sleeping pill. Rather a gentle invitation to sleep, but will   not override a lot of noise. As melatonin levels rise, body temp.   lowers. Adverse effects rare & usually related to v. high doses. May   increase dreaming. Melatonin works best in jet lag, shift workers, visually blind to re-set circadian   rhythm. For jetlag, works best if travel eastward. Take it a couple of   hours prior to when would have gone to sleep at one's origin.     5-Hydroxytryptophan (5-HTP) Dose: for depression and anxiety is  mg, one to three times per day. For sleep,  mg, 30-60 min prior to bedtime. Adverse effects: > 300 mg per day may ocasionaly cause GI upset such as nausea. In rare cases, 5HTP supplements have been associated with a serious medical condition called eosinophilia-myalgia syndrome (EMS).   People with EMS have severe muscle and joint pains, and some may have other symptoms such as a rash, skin thickening, trouble breathing, swelling, numbness and tingling, trouble concentrating, fever, and fatigue.  It is unclear if the cases of EMS were due to contamination of the supplements or due to a direct cause of the 5HTP itself.  If you choose to use 5HTP, please be aware of the symptoms of EMS and see a doctor if you develop any of the above. Use this supplement only with your doctors explicit guidance.     Counterproductive measures that don't help much in the long run:   Hypnotics don't work, with no improvement on sleep polysomography. They just prevent remembering poor sleep. Include Ambien, Lunesta, Benzodiazepines    OTC sleepers such as  "Benadryl (diphenhydramine) are problematic - anti-cholinergic side effects like dry mouth, can lead to REM suppression. ½ life ~ 18 hrs. can disturb memory.   Staying longer in bed when didn't sleep well is generally counter-productive.    Look up \"grounding techniques\" and/or \"anchoring demonstration\" online and try a few to see what may work for you. Practice these skills before you need them, when you are not feeling too anxious or triggered. You can also search for free guided meditation videos online to help improve your head space when you are feeling very anxious or triggered.    Recommendations regarding insomnia:  Wake-up at the same time every day  Refrain from \"napping\".  Refrain from going to bed unless you're tired  Utilize your bedroom for sleep only.  Avoid use of electronics including television and/or cellphone/computers.  Refrain from use of electronics including television and/or cellphones/computers prior to bed  Turn your alarm clock away so the light is not visible.  Attempt relaxation using various means like reading if you're restless in bed for approximately 15-20 minutes.  Participate in regular physical activities like exercise, although avoid approximately 3-4 hours prior to bed.  Morning exercise is ideal.  Avoid caffeine use prior to bedtime.  Consider tapering down excessive use of caffeine.  Avoid tobacco use prior to bedtime.  Avoid alcohol use prior to bedtime.  Consider reading \"No More Sleepless nights\" by Taras Gallagher, Ph.D.  Consider use of online resources including:  http://JumpCloud/cbt-online-insomnia-treatment.html  http://www.Phase Focus  CBT-I  Golden on your Smart Phone.  Go! To Sleep by the [facility].    Healthy Diet   The American Heart Association and the American College of Cardiology have long recommended a healthy diet for not only patients who are at risk for atherosclerotic cardiovascular disease (ASCVD) but also the general public. In keeping with " "this evidence-based recommendation, the \"2018 Guideline on the Management of Blood Cholesterol\" stresses that a healthy diet should include adequate intake of these essentials:   Vegetables, fruits, and whole grains   Legumes and nuts   Low-fat dairy products   Low-fat poultry (without the skin)   Fish and seafood   Nontropical vegetable oils     The recent guidelines do provide room for cultural food preferences in a healthy diet, but in general, all patients should limit their intake of saturated and avoid all trans fats, sweets, sugar-sweetened beverages, and red meats.  Please maintain adequate hydration of at least 2 Liters of water per day, and improve nutrition by decreasing portion sizes, avoiding fried foods, fast foods, inappropriate snacking and overly processed and packaged items with 'added sugars' (whether in drinks or foods), and observing nutritional facts information on any items that are packaged to reduce intake of saturated fats and AVOID trans fats as mentioned above. Again, consume whole foods such as vegetables, higher lean protein intake, and using healthier lifestyle plans such as the Mediterranean diet with healthier fats such as those from seeds, nuts, and using organic extra virgin olive oil or avocado oil in lieu of processed vegetable oils or margarine.    Physical Activity   Recommended DAILY exercise for at least 150 minutes of moderate exercise weekly!  In addition to a healthy diet, all patients should include regular physical activity in their weekly routines, at moderate to vigorous intensity. Any activity is better than nothing, so if your patients can't meet the recommendation of vigorous activity, moderate-intensity activity can still help them reduce their risk of ASCVD.     Below are the American Heart Association's recommendations for physical activity per week (preferably spread throughout the week):     For Overall Cardiovascular Health and Lowering Cholesterol   At least " 150 minutes of moderate-intensity physical activity (for example, 30 minutes, 5 days a week), or   At least 75 minutes of vigorous-intensity physical activity (for example, 25 minutes, 3 days a week); or   A combination of moderate- and vigorous-intensity aerobic activity, and   At least 2 days of moderate- to high-intensity muscle-strengthening activities (such as resistance weight training) for additional health benefits    Weight Control   It's important to work with patients to help them reach and maintain a healthy weight (Table 3). You may need to suggest that they adjust their caloric intake to avoid weight gain or, in overweight and obese patients, to promote weight loss.     Table 3. Body Mass Index           If you have thoughts of harming yourself or are otherwise in psychological crisis, do not hesitate to contact your [facility] Crisis hotline, or 911 or go to the nearest emergency room.  Adult Crisis Numbers  Suicide Prevention Hotline - Dial 9-8-8  [facility]: [phone] or [phone]  [facility]: [phone]  [facility]: [phone]  [facility]: [phone]  [place]: [phone] or [phone]  [facility]: [phone]  [facility]: [phone] or [facility] Crisis: [phone]  [facility]: [phone] (daytime).       [phone] (after hours, weekends, holidays)     Child/Adolescent Crisis Numbers   [facility]: [phone]   [facility]: [phone]   [place], [place]: [phone]   [place]: [phone]  Please note: Some [facility] do not have a separate number for Child/Adolescent specific crisis. If your county is not listed under Child/Adolescent, please call the adult number for your county     National Talk to Text Line   All Ages - 741-551    National Suicide Prevention Hotline: 1-913.859.5100 or call 009.

## 2024-01-10 NOTE — PSYCH
Subjective:     Patient ID: Garcia Cuevas is a 43 y.o. male.    [facility] Clinical Progress Notes      Specialized Services Documentation    Group Psychotherapy - NEUROGRAPHY    4882-7619: Garcia Cuevas was was present for this group due to intake.    Tx Plan Objective 1.1, 1.2, 1.4 Therapist Lance Bennett LCSW    Education Therapy   [id number] Garcia Cuevas was not present for morning assessment due to intake.    [id number] Garcia Cuevas engaged throughout the treatment day. Was engaged in learning related to Illness, Medication, Aftercare, and Wellness Tools. Staff utilized Verbal, Written, A/V, and Demonstration teaching methods.  Garcia Cuevas shared area of learning and set a goal for outside of program to work on his anxiety.      Tx Plan Objective: 1.1, 1.2, 1.4, Therapist:  Lance Bennett LCSW

## 2024-01-10 NOTE — PSYCH
Authorization submitted via [employer] @ 7503. Pending reference #: [id number].    Primary CM made aware.

## 2024-01-11 ENCOUNTER — OFFICE VISIT (OUTPATIENT)
Dept: PSYCHOLOGY | Facility: CLINIC | Age: 44
End: 2024-01-11
Payer: COMMERCIAL

## 2024-01-11 DIAGNOSIS — F31.9 BIPOLAR 1 DISORDER (HCC): Primary | ICD-10-CM

## 2024-01-11 DIAGNOSIS — F43.10 PTSD (POST-TRAUMATIC STRESS DISORDER): ICD-10-CM

## 2024-01-11 PROCEDURE — G0410 GRP PSYCH PARTIAL HOSP 45-50: HCPCS

## 2024-01-11 PROCEDURE — G0176 OPPS/PHP;ACTIVITY THERAPY: HCPCS

## 2024-01-11 PROCEDURE — G0177 OPPS/PHP; TRAIN & EDUC SERV: HCPCS

## 2024-01-11 NOTE — PSYCH
Subjective:     Patient ID: Garcia Cuevas is a 43 y.o. male.    [facility] Clinical Progress Notes      Specialized Services Documentation    Group Psychotherapy - TREE OF LIFE    [id number]: Garcia Cuevas actively participated in a psychotherapy group focused on life journey reflection. The group engaged in creative expression and self-reflection using a tree template in which guided elements were freely individualized. The group was provided with detailed instructions and supplies. Garcia Cuevas exhibited limited progress towards goals and required redirection for inappropriate commentary. Plan to continue with psychotherapy to promote further introspection and creative expression.     Tx Plan Objective 1.1, 1.2, 1.4 Therapist: Lance Bennett LCSW & BRANDI Morrison

## 2024-01-11 NOTE — PSYCH
[id number]: SADIQ s/w Shadi @ [facility] to provide verbal clinicals for authorization.   Auth approved  Auth #: [id number]  Approved 7 days  1/10/24-1/18/24  NRD/LCD: 1/18/24  NRW: Eddie BELTRE @ [phone]    Primary  aware

## 2024-01-11 NOTE — PSYCH
"Subjective:     Patient ID: Garcia Cuevas is a 43 y.o. male.    [facility] Clinical Progress Notes      Specialized Services Documentation  Therapist must complete separate progress note for each specific clinical activity in which the individual participated during the day.     Other Tx plan signed    Case Management Note    Kt [place], [place]    Current suicide risk : Low     [id number] This writer met with Garcia to review and sign his tx plan. We also spoke about Garcia not having a \"verbal filter\" and the need to consider other people's trauma histories while in group. Garcia acknowledged that he always just speaks what's on his mind and needs to work on it, however, he also put onus on the woman he offended in group. This writer acknowledged that Garcia wasn't intentionally trying to offend anyone, but reiterated that everyone has different backgrounds and different triggers so this needs to be considered.     Medications changes/added/denied? No    Treatment session number: 2    Individual Case Management Visit provided today? Yes     [facility] follow up physician's orders: None, next medication check will be next week with ANIRUDH Au.      "

## 2024-01-11 NOTE — PSYCH
Visit Time    Visit Start Time: 0930  Visit Stop Time:  1030  Total Visit Duration: 60 minutes    Subjective:     Patient ID: Garcia Cuevas is a 43 y.o. y.o. male.    [facility] Clinical Progress Notes      Specialized Services Documentation  Therapist must complete separate progress note for each specific clinical activity in which the individual participated during the day.       Allied Therapy    Garcia Cuevas actively shared in music therapy group focused on anger and emotional regulation skills.  Garcia engaged in task exploring effective versus ineffective ways to manage anger; in addition to skills to refocus in the moment.  He identified that his anger and PTSD are inter-related. Skills discussed to slow down and refocus.   Group explored the benefits of positive choices with intense emotion.  Some beginning  work toward goal noted . Continue AT to explore personal role in understanding and managing emotions.  Tx Plan Objective: 1.1,1.2 Therapist:  Ernestina HERNÁNDEZ

## 2024-01-12 ENCOUNTER — OFFICE VISIT (OUTPATIENT)
Dept: PSYCHOLOGY | Facility: CLINIC | Age: 44
End: 2024-01-12
Payer: COMMERCIAL

## 2024-01-12 DIAGNOSIS — F17.200 TOBACCO USE DISORDER: ICD-10-CM

## 2024-01-12 DIAGNOSIS — F15.21 METHAMPHETAMINE USE DISORDER, SEVERE, IN EARLY REMISSION (HCC): ICD-10-CM

## 2024-01-12 DIAGNOSIS — F43.10 PTSD (POST-TRAUMATIC STRESS DISORDER): ICD-10-CM

## 2024-01-12 DIAGNOSIS — F31.9 BIPOLAR 1 DISORDER (HCC): Primary | ICD-10-CM

## 2024-01-12 DIAGNOSIS — F12.90 MARIJUANA USE, CONTINUOUS: ICD-10-CM

## 2024-01-12 PROCEDURE — G0177 OPPS/PHP; TRAIN & EDUC SERV: HCPCS

## 2024-01-12 PROCEDURE — G0410 GRP PSYCH PARTIAL HOSP 45-50: HCPCS

## 2024-01-12 PROCEDURE — G0176 OPPS/PHP;ACTIVITY THERAPY: HCPCS

## 2024-01-12 NOTE — BH CRISIS PLAN
"Client Name: Garcia Cuevas       Client YOB: 1980    [facility] Safety Plan      Creation Date: 1/12/24    Created By: Demetrio Galindo       Step 1: Warning Signs:   Warning Signs   self hate   feel like I\"m failing   Frustration increases            Step 2: Internal Coping Strategies:   Internal Coping Strategies   sit downstairs   isolate to listen to music   meditate            Step 3: People and social settings that provide distraction:   Name Contact Information   Will phone number is in the \"other\" cell phone   Shani [phone]    [facility]   Just walk around           Step 4: People whom I can ask for help during a crisis:      Name Contact Information    [place] Help Line 211    [facility] Crisis Help Line Dial 557 then press 1    [place], [place] Crisis [phone]      Step 5: Professionals or agencies I can contact during a crisis:      [facility]/Agency Name Phone Emergency Contact    n/a at this time        Local Emergency Department Emergency Department Phone Emergency Department Address    [facility] [phone] [address] [place] [id number]        Crisis Phone Numbers:   Suicide Prevention Lifeline: Call or Text  767 Crisis Text Line: Text HOME to 447-336   Please note: Some [facility] do not have a separate number for Child/Adolescent specific crisis. If your county is not listed under Child/Adolescent, please call the adult number for your county      Adult Crisis Numbers: Child/Adolescent Crisis Numbers   [facility]: [phone] [facility]: [phone]   [facility]: [phone] [facility]: [phone]   [facility]: [phone] [place]: [phone]   [facility]: [phone] [place]: [phone]   [place] [facility]: [phone]   [facility]: [phone]   [facility]: [phone]   [place] [facility]: [phone] (daytime) [phone] (after hours, " weekends, holidays)      Step 6: Making the environment safer (plan for lethal means safety):   Plan: NO lethal methods at this time     Optional: What is most important to me and worth living for?   Living for my son. Taking caring of my wife who is working on health issues.     [facility] Safety Plan. Asha Saeed and Rehan Ignacio. Used with permission of the authors.

## 2024-01-12 NOTE — PSYCH
Subjective:     Patient ID: Garcia Cuevas 43 y.o.     [facility] Clinical Progress Notes      Specialized Services Documentation  Therapist must complete separate progress note for each specific clinical activity in which the individual participated during the day.     Education Therapy     2852-4633 Garcia Cuevas  actively shared in morning assessment and goal review. Presented as Receptive related to readiness to learn.  Garcia Cuevas did complete goal from last treatment day identifying gaining support. did not present with any barriers to learning.         Tx Plan Objective: 1.1, 1.2, 1.4, Therapist: Chely CARDOZO Ed.    Psychotherapy Group    Subjective:     Patient ID: Garcia Cuevas 43 y.o.     This group was facilitated in a private office.  ([id number])Garcia Cuevas actively engaged in psychoeducational group about the PLEASE skill. The skill helps to maintain and regulate emotional expression/regulation. Group discovered strategies that help them to take care of physical and emotional well being on a short term and long term basis. The group talked about understanding the purpose, and meaning of self care in regards to physical, intellectual, and emotional expression, regulation , and recognition, and how it affects themselves and others.. Teaching on the emphasis of self monitoring and self-care, who the group can go to for help was brought up as well. Group was encouraged to ask questions in an open forum at the end of group. Positive progress displayed through engagement in topic.Positive progress displayed through engagement in topic. Pt will continue to engage in psychotherapy to encourage self realization in treatment.  Treatment Plan Objective 1.1, 1.2, 1.3, 1.4 Therapist: Demetrio CARDOZO Ed.

## 2024-01-12 NOTE — PSYCH
Subjective:      Patient ID:Ritika Cuevas 43 y.o. male     [facility] Clinical Progress Notes       Specialized Services Documentation  Therapist must complete separate progress note for each specific clinical activity in which the individual participated during the day.      EDUCATION GROUP      1045 to 1143 Certified Peer Specialist (CPS), Shayla shared her life story as she co-led this session. Garcia Cuevas attentively listened to ELISA Mcguire. This group encouraged power of learning about self, accepting illness and personal responsibility in recovery. Shayla also shared the acronym she uses to remind her about what tools she can use daily to maintain mental health wellness.  Community resources reviewed in addition to personal resources like the affirmations.  NEGRITO folder handed out to group members with local resources/support groups. Good progress toward goals noted. Continue psychotherapy to encourage self -awareness and healthy engagement of supports.      TX Plan Objectives: 1.1, 1.2, 1.3, 1.4   Therapist: Pat Flynn RN

## 2024-01-12 NOTE — PSYCH
"Subjective:     Patient ID: Garcia Cuevas is a 43 y.o. male.    [facility] Clinical Progress Notes      Specialized Services Documentation  Therapist must complete separate progress note for each specific clinical activity in which the individual participated during the day.     Group Psychotherapy ([id number])    The group engaged in the wellness assessment, which evaluates progress on several different areas of wellness/wellbeing: physical, emotional, cognitive, vocational, social and spiritual. Clients rated their progress and discussed areas that need work. By completing and discussing areas of progress and challenges, members are connected and reminded that, in their mental health struggle, they are not alone. Topics of discussion revolved around positive experiences within each area of wellness as well as the challenging aspects to wellness within their past week.  Garcia Cuevas continues to make progress towards goals through participation in group activity and personal disclosures. Continue psychotherapy groups to encourage further exploration of needs, personal awareness, and skills.     Tx Plan Objective: 1.1,1.2, 1.4   Therapist: Kt Price MS      Education Therapy     6573-0630 Garcia Cuevas engaged throughout the treatment day. Was engaged in learning related to Illness, Medication, Aftercare, and Wellness Tools. Staff utilized Verbal, Written, A/V, and Demonstration teaching methods.  Garcia Cuevas shared area of learning and set a goal for outside of program to speak to his wife's family more.      Tx Plan Objective: 1.1,1.2, 1.4   Therapist: Kt Price MS      Other Phone call and email with Elenita Taylor of [facility]: \"Good morning!     A few issues have araised this morning with Garcia.     We were unable to make contact with him to confirm . His phone is not working. To be able to use [facility] they must have a working phone and  to confirm . As a " "courtesy the  did go to the bus stop and he was there waiting.  Quentin told the  he needs to be picked up from his residence address which I told the  we could not do and that his was the agreed meeting place. We then wanted it changed to the [facility] in [place] which is fine but again the patient must have a working phone for us to be able to provide him transport.    Patient had a very strong odor of marijuana and told the  it was legal in [place] so there's no issue. The  had to have the windows down the entire trip and now we have to take our van out of service because the smell is so strong and we cannot put patients in our vehicle under these conditions. This is not acceptable by the patient in any means to arrive for transport in his condition.   Please call me at your earliest convenience so we can discuss future transport with Quentin due to safety concerns.     Thank you   Elenita\"    This writer did call Elenita and confirmed that Quentin's behavior would not be tolerated and transportation would be immediately discontinued should any of the issues mentioned occur again on Monday. Due to Quentin's actions, the  was forced to delay the  of other patients in order to take quentin directly to program, drop the van off for cleaning, and take another vehicle out to  other patients. This writer will relay everything to Quentin and reinforce that he will lose transportation if he cannot abide by the policies which he agreed to/signed.      Case Management Note    Kt Price, MS    Current suicide risk : Low     [id number] This writer provided Quentin his completed disability paperwork. This writer also reviewed Quentin's unacceptable behavior related to transportation this morning. Quentin attempted to focus the conversation on the fact that \"his friends were smoking weed in the car\" and that \"he couldn't tell them to stop since they were driving him to [place]\". However, " this writer redirected to the issue being that he cannot smell of marijuana in program or in transportation no matter who is the one smoking it. This writer reinforced that Garcia would be immediately discharged from [facility] transport services if he did this again.     Medications changes/added/denied? No    Treatment session number: 3    Individual Case Management Visit provided today? Yes     [facility] follow up physician's orders: None, next medication check will be next week with ANIRUDH Au.

## 2024-01-12 NOTE — PSYCH
Subjective:     Patient ID: Garcia Cuevas 43 y.o.     [facility] Clinical Progress Notes      Specialized Services Documentation  Therapist must complete separate progress note for each specific clinical activity in which the individual participated during the day.     Case Management    Spoke with Garcia Cuevas for case management. January 12, 2023. 8495 . Garcia Cuevas is aware of next scheduled 1:1 meeting.     Current suicide risk : Low           Treatment session number: 3     Individual Case Management Visit provided today? Yes, this team member spoke with Garcia about the [facility] safety plan, and complete the assessment with him at this time.      [facility] follow up physician's orders: Continue to follow orders.

## 2024-01-15 ENCOUNTER — OFFICE VISIT (OUTPATIENT)
Dept: PSYCHOLOGY | Facility: CLINIC | Age: 44
End: 2024-01-15
Payer: COMMERCIAL

## 2024-01-15 ENCOUNTER — OFFICE VISIT (OUTPATIENT)
Dept: PSYCHIATRY | Facility: CLINIC | Age: 44
End: 2024-01-15
Payer: COMMERCIAL

## 2024-01-15 ENCOUNTER — APPOINTMENT (OUTPATIENT)
Dept: LAB | Facility: HOSPITAL | Age: 44
End: 2024-01-15
Payer: COMMERCIAL

## 2024-01-15 DIAGNOSIS — F31.9 BIPOLAR 1 DISORDER (HCC): Primary | ICD-10-CM

## 2024-01-15 DIAGNOSIS — F31.4 BIPOLAR DISORDER, CURRENT EPISODE DEPRESSED, SEVERE, WITHOUT PSYCHOTIC FEATURES (HCC): Primary | ICD-10-CM

## 2024-01-15 DIAGNOSIS — G47.09 OTHER INSOMNIA: ICD-10-CM

## 2024-01-15 DIAGNOSIS — F41.9 ANXIETY: ICD-10-CM

## 2024-01-15 DIAGNOSIS — F43.10 PTSD (POST-TRAUMATIC STRESS DISORDER): ICD-10-CM

## 2024-01-15 DIAGNOSIS — F31.9 BIPOLAR 1 DISORDER (HCC): ICD-10-CM

## 2024-01-15 DIAGNOSIS — F43.12 POST-TRAUMATIC STRESS DISORDER, CHRONIC: ICD-10-CM

## 2024-01-15 PROBLEM — E55.9 VITAMIN D DEFICIENCY, UNSPECIFIED: Chronic | Status: ACTIVE | Noted: 2021-12-02

## 2024-01-15 PROBLEM — G47.00 INSOMNIA, UNSPECIFIED: Status: ACTIVE | Noted: 2022-02-23

## 2024-01-15 PROBLEM — Z56.3 STRESSFUL WORK SCHEDULE: Status: ACTIVE | Noted: 2022-02-23

## 2024-01-15 PROBLEM — N52.9 MALE ERECTILE DYSFUNCTION, UNSPECIFIED: Chronic | Status: ACTIVE | Noted: 2022-02-23

## 2024-01-15 PROBLEM — R03.0 ELEVATED BLOOD-PRESSURE READING, WITHOUT DIAGNOSIS OF HYPERTENSION: Status: ACTIVE | Noted: 2021-12-02

## 2024-01-15 LAB — VALPROATE SERPL-MCNC: 56 UG/ML (ref 50–100)

## 2024-01-15 PROCEDURE — G0176 OPPS/PHP;ACTIVITY THERAPY: HCPCS

## 2024-01-15 PROCEDURE — 36415 COLL VENOUS BLD VENIPUNCTURE: CPT

## 2024-01-15 PROCEDURE — G0410 GRP PSYCH PARTIAL HOSP 45-50: HCPCS

## 2024-01-15 PROCEDURE — 99214 OFFICE O/P EST MOD 30 MIN: CPT | Performed by: NURSE PRACTITIONER

## 2024-01-15 PROCEDURE — G0177 OPPS/PHP; TRAIN & EDUC SERV: HCPCS

## 2024-01-15 PROCEDURE — 80164 ASSAY DIPROPYLACETIC ACD TOT: CPT

## 2024-01-15 NOTE — PSYCH
PHP MEDICATION MANAGEMENT NOTE        [facility]    Name and Date of Birth:  Garcia Cuevas 43 y.o. 1980 MRN: [id number]    Date of Visit: January 15, 2024    Visit Time    Visit Start Time: 1000   Visit Stop Time: 1030  Total Visit Duration:  30 minutes     The total visit duration detailed above includes: patient engagement, medication management, psychotherapy/counseling, discussion regarding treatment goals, and coordination of care.      Note Share Disclaimer:     This note was not shared with the patient due to reasonable likelihood of causing patient harm    No Known Allergies  SUBJECTIVE:    Garcia is seen today for a follow up for Bipolar Disorder, PTSD, Generalized Anxiety Disorder, and insomnia. He continues to experience on and off depressive symptoms since beginning PHP.  Tolerating restarting of medications.  Notes some improvement, less mood lability, less irritability.  Continues with depressive symptoms and tearful during our interview and excessive guilt.  Still feels overwhelmed with work and home issues.  Worried about his mother and upset that he cannot see her due to not having a car currently.  Continues to rate his depression a 5 out of 10.  Denying any suicidal thoughts or passive death wish.  Noting some improvement since starting the partial and gaining insight and learning new skills.  I did remind him of our need for a valproic acid level sometime this week.    He denies any side effects from current psychiatric medications.    PLAN:  Continue Risperdal 4 mg at bedtime  Continue Depakote 1000 mg at bedtime  Valproic acid level this week    Aware of 24 hour and weekend coverage for urgent situations accessed by calling [facility] main practice number  Continue partial hospitalization program    Diagnoses and all orders for this visit:    Bipolar disorder, current episode depressed, severe, without psychotic  features (HCC)    Post-traumatic stress disorder, chronic    Other insomnia    Anxiety        Current Outpatient Medications on File Prior to Visit   Medication Sig Dispense Refill    divalproex sodium (DEPAKOTE ER) 500 mg 24 hr tablet Take 2 tablets (1,000 mg total) by mouth daily at bedtime 60 tablet 0    risperiDONE (RisperDAL) 2 mg tablet Take 1 tablet (2 mg total) by mouth 2 (two) times a day 60 tablet 0    VITAMIN D PO Take 50,000 Units by mouth once a week       No current facility-administered medications on file prior to visit.       HPI ROS Appetite Changes and Sleep:     He reports fluctuating sleep pattern, fluctuating appetite, fluctuating energy levels. Denies homicidal ideation, denies suicidal ideation    Review Of Systems:   no complaints, all other systems are negative         Mental Status Evaluation:    Appearance:  age appropriate   Behavior:  pleasant, cooperative   Speech:  normal rate, normal volume   Mood:  depressed   Affect:  tearful   Thought Process:  goal directed   Associations: intact associations   Thought Content:  no overt delusions   Perceptual Disturbances: none   Risk Potential: Suicidal ideation - None  Homicidal ideation - None  Potential for aggression - No   Sensorium:  oriented to person, place, time/date, and situation   Memory:  recent and remote memory grossly intact   Consciousness:  alert and awake   Attention: decreased concentration and decreased attention span   Insight:  limited   Judgment: limited   Gait/Station: normal gait/station, normal balance   Motor Activity: no abnormal movements       History Review:The following portions of the patient's history were reviewed and updated as appropriate: psychiatric history, trauma history allergies, current medications, past family history, past medical history, past social history, past surgical history, and problem list   OBJECTIVE:     Vital signs in last 24 hours:    There were no vitals filed for this  visit.  Laboratory Results: I have personally reviewed all pertinent laboratory/tests results.    Medications Risks/Benefits:      Risks, Benefits And Possible Side Effects Of Medications:    Discussed risks and benefits of treatment with patient including risk of suicidality, serotonin syndrome, increased QTc interval and SIADH related to treatment with antidepressants; Risk of induction of manic symptoms in certain patient populations, risk of parkinsonian symptoms, metabolic syndrome, tardive dyskinesia and neuroleptic malignant syndrome related to treatment with antipsychotic medications, and risk of liver impairment related to treatment with Depakote     Controlled Medication Discussion:     Not applicable - controlled prescriptions are not prescribed by this practice    ANIRUDH Au 01/15/24

## 2024-01-15 NOTE — PSYCH
"Subjective:     Patient ID: Garcia Cuevas 43 y.o. Male    [facility] Clinical Progress Notes      Specialized Services Documentation  Therapist must complete separate progress note for each specific clinical activity in which the individual participated during the day.     Group Psychotherapy - Open Process    8532-0680 Garcia Cuevas actively participated in an open processing group on increasing empowerment and having an opportunity to be heard when one might feel isolated in sharing their experiences.  spent time engaging group participants with open ended questions, reflective questions, and encouragement from other group members. In addition, it is important for the group to be able to receive multiple perspectives and feedback from other group members in a safe environment.  provided space for members to share as they felt comfortable, active listening, encouragement, and offered support to group when warranted. This structure helped support the group in feeling cathartic, gain some interpersonal learning, group cohesiveness, altruism, and instilling hope. Garcia Cuevas contributed to discussion by sharing about the topic discussed, relating to group members, and allowing self to be open/vulnerable. Garcia Cuevas shared in particular how he struggles to \"maintain a filter.\" He discussed how his  background impacted his strictness and how society is ever changing, finding it difficult to adapt.  good  progress noted towards goal. Continue with open processing therapy to provide space to support individuals in building trust, gain corrective emotional experiences, and cultivate healthier inter-dependency with others.    Tx Plan Objective 1.1, 1.2, 1.4 Therapist: BRANDI Bond.     "

## 2024-01-15 NOTE — PSYCH
Subjective:     Patient ID: Garcia Cuevas is a 43 y.o. male.    [facility] Clinical Progress Notes      Specialized Services Documentation  Therapist must complete separate progress note for each specific clinical activity in which the individual participated during the day.     Psychotherapeutic Group ([id number])    Today's group was about personalities and how to better understand who we are (our characteristics). They learned about the different Arguello-Winters personality types and assessed themselves to gain further understanding of who they are. The group identified their personality types and reviewed ways this knowledge could be used to promote positive change, self-reflection, and a more comfortable life. These goals were achieved through multiple discussions, an MBTI done by all of the clients, and review of MBTI related materials. Individuals were then  into smaller groups and provided guiding questions to explore the provided materials to gain insights about themselves and each other. Garcia Cuevas actively participated by completing the MBTI and discussing with his small group. Continue psychotherapy groups to encourage further exploration of needs, personal awareness, and skills.     Treatment Objectives: 1.1, 1.2, 1.4 Therapist: Kt Price MS      Case Management Note    Kt Price MS    Current suicide risk : Low     Not a case management day for Garcia Cuevas today. Did not reach out with any additional questions and concerns and aware of next scheduled 1:1.     Medications changes/added/denied? No    Treatment session number: 4    Individual Case Management Visit provided today? No    [facility] follow up physician's orders: Medication check completed today. Please see note from ANIRUDH Au for more details.

## 2024-01-15 NOTE — PSYCH
Subjective:     Patient ID: Garcia Cuevas is a 43 y.o. male.    [facility] Clinical Progress Notes      Specialized Services Documentation    Group Psychotherapy - Window of Tolerance     1609-6384: Garcia Cuevas actively participated in a psychoeducational focused on Window of Tolerance. The group was led through a discussion about what the window of tolerance is and identifying their ideal emotinal zone that a person needs for optimal functioning. In addition, they learned how to recognize ways when they step outside of their optimal zone, and learned to identify when engaged in hyperarousal or hypo-arousal. Group participants engaged in and completed a worksheet where they learned how to become more aware of their window of tolerance through identifying symptoms they experience when they are hyper and hyparoused.      used the following structure to support and lead the group worksheets to identify the severity of their hyper and hypoarousal states, open discussion/processing, peer support, and psychoeducation.  Good progress noted, plan to continue with psychoeducation.    Tx Plan Objective 1.1, 1.2, 1.4  Therapist: Lance Bennett LCSW    Education Therapy   6214-4972 Garcia Cuevas actively shared in morning assessment and goal review. Presented as Receptive related to readiness to learn.  Garcia Cuevas did complete goal from last treatment day identifying gaining support, and did not present with any barriers to learning.     2058-8951 Garcia Cuevas engaged throughout the treatment day. Was engaged in learning related to Illness, Medication, Aftercare, and Wellness Tools. Staff utilized Verbal, Written, A/V, and Demonstration teaching methods.  Garcia Cuevas shared area of learning and set a goal for outside of program to find a healthy way to communicate.      Tx Plan Objective 1.1, 1.2, 1.4  Therapist: Lance Bennett LCSW

## 2024-01-16 ENCOUNTER — APPOINTMENT (OUTPATIENT)
Dept: PSYCHOLOGY | Facility: CLINIC | Age: 44
End: 2024-01-16
Payer: COMMERCIAL

## 2024-01-16 ENCOUNTER — DOCUMENTATION (OUTPATIENT)
Dept: PSYCHOLOGY | Facility: CLINIC | Age: 44
End: 2024-01-16

## 2024-01-16 NOTE — PROGRESS NOTES
Subjective:     Patient ID: Garcia Cuevas is a 43 y.o. male.    [facility] Clinical Progress Notes      Specialized Services Documentation  Therapist must complete separate progress note for each specific clinical activity in which the individual participated during the day.     Case Management Note    Kt Price MS    Current suicide risk : Low     Garcia called out today due to the winter weather conditions.    1540- This writer called Garcia to check in but was unable to leave a voicemail.     Medications changes/added/denied? No    Treatment session number: 5    Individual Case Management Visit provided today? No    [facility] follow up physician's orders: Return to program tomorrow.

## 2024-01-17 ENCOUNTER — OFFICE VISIT (OUTPATIENT)
Dept: PSYCHOLOGY | Facility: CLINIC | Age: 44
End: 2024-01-17
Payer: COMMERCIAL

## 2024-01-17 DIAGNOSIS — F43.10 PTSD (POST-TRAUMATIC STRESS DISORDER): ICD-10-CM

## 2024-01-17 DIAGNOSIS — F15.21 METHAMPHETAMINE USE DISORDER, SEVERE, IN EARLY REMISSION (HCC): ICD-10-CM

## 2024-01-17 DIAGNOSIS — F31.9 BIPOLAR 1 DISORDER (HCC): Primary | ICD-10-CM

## 2024-01-17 DIAGNOSIS — F17.200 TOBACCO USE DISORDER: ICD-10-CM

## 2024-01-17 DIAGNOSIS — F12.90 MARIJUANA USE, CONTINUOUS: ICD-10-CM

## 2024-01-17 PROCEDURE — G0410 GRP PSYCH PARTIAL HOSP 45-50: HCPCS

## 2024-01-17 PROCEDURE — G0177 OPPS/PHP; TRAIN & EDUC SERV: HCPCS

## 2024-01-17 PROCEDURE — G0176 OPPS/PHP;ACTIVITY THERAPY: HCPCS

## 2024-01-17 NOTE — PSYCH
Subjective:     Patient ID: Garcia Cuevas is a 43 y.o. male.    [facility] Clinical Progress Notes      Specialized Services Documentation  Therapist must complete separate progress note for each specific clinical activity in which the individual participated during the day.     Group Psychotherapy ([id number])  Garcia Cuevas engaged in a group focusing on practicing mindfulness, creating a more cohesive group environment, and allowing members to become more familiar with one another (to promote a more comfortable environment for sharing.  Each group member was given paper to write down four different unique traits, interesting facts, hobbies/interests, sources of pride or obstacles they have overcome in life.  After writing down each statement, each group member tore them into four different pieces of paper then crumbled them up and threw in one big basket.  then picked one at a time out of the basket with the group having to guess who it belonged to. If the group member was guessed correctly (the fact belonged to them), they had the opportunity to explain further and field questions from peers. Group members were encouraged to relate to similarities of other group members while also being mindful and engaging during the group. An example of the personal fact shared by Garcia Cuevas was that he worked for [employer].  Garcia  will continue with life skills and psychotherapy groups.  Good progress made towards treatment. Continue psychotherapy groups to encourage further exploration of needs, personal awareness, and skills.    Tx Plan Objective: 1.1,1.2, 1.4   Therapist: Kt Price MS    Education Therapy     Tx Plan Objective: 1.1,1.2, 1.4   Therapist: Kt Price MS    Other Spoke with Eddie from [facility] to set up UR. Date of auth extended until 1/19 due to Garcia's absence yesterday. UR will be held 1/22 @ 9am.     Case Management Note    Kt Price MS    Current  suicide risk : Low     Not a case management day for Garcia Cuevas today. Did not reach out with any additional questions and concerns and aware of next scheduled 1:1.       Medications changes/added/denied? No    Treatment session number: 5    Individual Case Management Visit provided today? No    [facility] follow up physician's orders: None, next medication check will be Friday with ANIRUDH Au.

## 2024-01-17 NOTE — PSYCH
Subjective:      Patient ID:+Garcia Cuevas 43 y.o. male     [facility] Clinical Progress Notes       Specialized Services Documentation  Therapist must complete separate progress note for each specific clinical activity in which the individual participated during the day.         Education Therapy     1045 to 1144 - Garcia Cuevas attended the group on Part 1 of the Wellness Recovery Action Plan (WRAP). Group members were educated on the background of the WRAP. This writer explained the benefit of utilizing the WRAP prior to the members initiating it. Members then focused on developing the following portions of WRAP: the Wellness Toolbox. This writer encouraged the members of group to continue utilizing the WRAP packet to develop plans inside and outside of program.         Garcia Cuevas displayed understanding through engagement in the topic with the group . For Garcia Cuevas,  GOOD progress toward goals was noted, through their engagement in group. Continue psychotherapy to encourage self-awareness and home practice of skills to support wellness.    Treatment Plan Objective 1.1, 1.2, 1.3, 1.4 Therapist: Pat Flynn RN

## 2024-01-17 NOTE — PSYCH
"Subjective:     Patient ID: Garcia Cuevas 43 y.o. Male    [facility] Clinical Progress Notes      Specialized Services Documentation  Therapist must complete separate progress note for each specific clinical activity in which the individual participated during the day.     Group Psychotherapy - Trauma     2685-6236 Garcia Cuevas actively participated in in a psychoeducational group about trauma and the body and brain's responses. Group members learned about what trauma is through discussion of “Big T” and “Little t” traumas, how it impacts our day-to-day lives, and how it can impact our brain development which impacts our coping and wellness. The group learned about how personal experiences shape our perceptions of trauma that is related to pre-disposing factors such as distress tolerance, environment, beliefs, morals, and values. This was introduced as the 3 E's:   Event  Experience and   Effects of understanding trauma.     The group then learned about the trauma tree and how understanding each part can help on the road to recovery. In addition,  shared some about the brain structure and its part in traumatic memories. This included the brain structure using Dong Pryor's \" of the brain\" resource; discussing the brain stem, limbic system, and the cerebral cortex. Members were invited to share their experiences if they were willing, engage in meaningful discussions and engaged in reflective thinking. Lastly, group members were provided the worksheet: \"Growing Stronger from Trauma.\" This worksheet helps individuals identify the strengths they used to cope with their past traumas and new one they've come out with as a result.  Garcia Cuevas contributed to discussion by sharing about the topic discussed, relating to group members, and allowing self to be open/vulnerable. Garcia maki about his flashbacks when his children play [employer] of [employer] and his struggles with trauma. good  progress noted " towards goals. Continue with psychoeducation to promote further understanding of the influence of trauma in one's life and ability to process such trauma through use of skills learned while in PHP.      TX Plan Objectives: 1.1, 1.2, 1.4  Therapist: BRANDI Bond

## 2024-01-17 NOTE — PSYCH
Subjective:     Patient ID: Garcia Cuevas is a 43 y.o. male.    [facility] Clinical Progress Notes      Specialized Services Documentation  Therapist must complete separate progress note for each specific clinical activity in which the individual participated during the day.     Education Therapy   2335-4713 Garcia Cuevas actively shared in morning assessment and goal review. Presented as Receptive related to readiness to learn.  Garcia Cuevas did complete goal from last treatment day identifying gaining hope. did not present with any barriers to learning.     2028-0214 Garcia Cuevas engaged throughout the treatment day. Was engaged in learning related to Illness and Wellness Tools. Staff utilized Verbal, Written, and Demonstration teaching methods.  Garcia Cuevas shared area of learning and set a goal for outside of program to monitor his responses.      Tx Plan Objective: 1.1, 1.2, 1.4, Therapist:  AAYUSH Carey

## 2024-01-17 NOTE — PSYCH
Subjective:     Patient ID: Garcia Cuevas is a 43 y.o. male.    [facility] Clinical Progress Notes      Specialized Services Documentation    Group Psychotherapy - CIRCLES OF CONTROL    0794-8347: Garcia Cuevas was present for a group on circles of control. Group members were educated on understanding what is in their control or outside of their control. Group members were educated on the biopsychosocial effects that can occur when over-thinking or perseverating on things that are outside of their control.     Group members participated in an activity where they were asked to identify topics/areas that are in their control, in their Kaw of influence, and in their Kaw of concern. The group was provided with individual handouts with the three labeled circles to create their own personal diagrams. Discussion was encouraged and prompted throughout the group, and members were asked to share any questions as they arose. Garcia Cuevas actively engaged with  and other group members. Good progress noted towards goals. Plan with psychotherapy to strengthen awareness of control.     Tx Plan Objective 1.1, 1.2, 1.4  Therapist: Lance Bennett LCSW & AAYUSH Carey

## 2024-01-18 ENCOUNTER — OFFICE VISIT (OUTPATIENT)
Dept: PSYCHOLOGY | Facility: CLINIC | Age: 44
End: 2024-01-18
Payer: COMMERCIAL

## 2024-01-18 DIAGNOSIS — F12.90 MARIJUANA USE, CONTINUOUS: ICD-10-CM

## 2024-01-18 DIAGNOSIS — F15.21 METHAMPHETAMINE USE DISORDER, SEVERE, IN EARLY REMISSION (HCC): ICD-10-CM

## 2024-01-18 DIAGNOSIS — F17.200 TOBACCO USE DISORDER: ICD-10-CM

## 2024-01-18 DIAGNOSIS — F43.10 PTSD (POST-TRAUMATIC STRESS DISORDER): ICD-10-CM

## 2024-01-18 DIAGNOSIS — F31.9 BIPOLAR 1 DISORDER (HCC): Primary | ICD-10-CM

## 2024-01-18 PROCEDURE — G0176 OPPS/PHP;ACTIVITY THERAPY: HCPCS

## 2024-01-18 PROCEDURE — G0177 OPPS/PHP; TRAIN & EDUC SERV: HCPCS

## 2024-01-18 PROCEDURE — G0410 GRP PSYCH PARTIAL HOSP 45-50: HCPCS

## 2024-01-18 PROCEDURE — 90832 PSYTX W PT 30 MINUTES: CPT

## 2024-01-18 NOTE — PSYCH
Subjective:     Patient ID: Garcia Cuevas is a 43 y.o. male.    [facility] Clinical Progress Notes      Specialized Services Documentation  Therapist must complete separate progress note for each specific clinical activity in which the individual participated during the day.     Psychotherapeutic Group ([id number])    The group learned about the science behind loneliness and how we create a self-sustaining cycle of feeling lonely/isolated. They identified how they manifest loneliness in their own lives. Multiple discussions took place regarding loneliness and how we can combat this feeling. We also discussed the health concerns associated with feeling chronic loneliness. The group watched a short video on loneliness which was very eye opening for multiple group members. The group then learned/shared a variety of methods to address loneliness as an individual and how to meet new people. Garcia Cuevas  participated in the group by sharing his need to socialize and why he finds isolating so much more comfortable/safer. They appear to be working towards goals. Continue psychotherapy groups to encourage further exploration of needs, personal awareness, and skills.     Tx Plan Objective: 1.1,1.2, 1.4   Therapist: Kt Price MS      Education Therapy     [id number] Garcia Cuevas engaged throughout the treatment day. Was engaged in learning related to Illness, Medication, Aftercare, and Wellness Tools. Staff utilized Verbal, Written, A/V, and Demonstration teaching methods.  Garcia Cuevas shared area of learning and set a goal for outside of program to communicate better with his family.      Tx Plan Objective: 1.1,1.2, 1.4   Therapist: Kt Price MS      Case Management Note    Kt Price MS    Current suicide risk : Low     [phone] This writer met with Garcia for case management. This writer provided positive feedback to Garcia explaining how much more grounded, calm, and mindful he's  been lately. Garcia reported numerous signs of progress and appeared to have gained several new insights. Garcia understands where his issues lie and how his actions cause more problems for himself and others when he doesn't think first. We reviewed a few tools for stopping before thinking and Garcia described how he's utilized these tools in practice already.    Medications changes/added/denied? No    Treatment session number: 6    Individual Case Management Visit provided today? Yes     [facility] follow up physician's orders: None, next medication check will be tomorrow with ANIRUDH Au.

## 2024-01-18 NOTE — PSYCH
Subjective:    Patient ID: Garcia Cuevas is a 43 y.o. male      [facility] Clinical Progress Notes      Specialized Services Documentation  Therapist must complete separate progress note for each specific clinical activity in which the individual participated during the day.       Allied Therapy ([id number]) Garcia Cuevas attended group focused on identifying where one is in the process of making changes.  explained that an important part of recovery is the process of making changes which can be broken down into five major steps (awareness, determination, action, maintenance, and relapse). Group discussed the value in each stage and how it prepares us for the next. Garcia was observed completing worksheet to identify a change that are currently working on, what stage of process they are at, and who can assist them to move forward? Garcia actively shared he  would like to change saying no without feeling guilty. Some positive effort noted toward treatment plan goals displayed through participation, personal disclosure, and attentiveness. Continue to involve in AT groups to increase motivation to persevere through awareness of various benefits of making changes.  Tx Plan Objective: 1.1, 1.2, 1.4, Therapist:  MARY ANN Carey/L    Education Therapy   [phone] Garcia Cuevas actively shared in check in and goal review. Presented as Receptive related to readiness to learn.  Garcia Cuevas  did complete goal from last treatment day identifying gaining hope. did not present with any barriers to learning.     Tx Plan Objective: 1.1, 1.2, 1.4, Therapist:  AAYUSH Carey

## 2024-01-19 ENCOUNTER — APPOINTMENT (OUTPATIENT)
Dept: PSYCHOLOGY | Facility: CLINIC | Age: 44
End: 2024-01-19
Payer: COMMERCIAL

## 2024-01-19 ENCOUNTER — DOCUMENTATION (OUTPATIENT)
Dept: PSYCHOLOGY | Facility: CLINIC | Age: 44
End: 2024-01-19

## 2024-01-19 NOTE — PROGRESS NOTES
Subjective:     Patient ID: Garcia Cuevas is a 43 y.o. male.    [facility] Clinical Progress Notes      Specialized Services Documentation  Therapist must complete separate progress note for each specific clinical activity in which the individual participated during the day.     Other tx plan could not be updated today due to absence    Case Management Note    Kt Price, MS    Current suicide risk : Low     Garcia called off from program due to severe winter weather.     Medications changes/added/denied? No    Treatment session number: 7    Individual Case Management Visit provided today? No    [facility] follow up physician's orders: None, next medication check will be next week with ANIRUDH Au.

## 2024-01-22 ENCOUNTER — OFFICE VISIT (OUTPATIENT)
Dept: PSYCHIATRY | Facility: CLINIC | Age: 44
End: 2024-01-22
Payer: COMMERCIAL

## 2024-01-22 ENCOUNTER — OFFICE VISIT (OUTPATIENT)
Dept: PSYCHOLOGY | Facility: CLINIC | Age: 44
End: 2024-01-22
Payer: COMMERCIAL

## 2024-01-22 DIAGNOSIS — F43.10 PTSD (POST-TRAUMATIC STRESS DISORDER): ICD-10-CM

## 2024-01-22 DIAGNOSIS — F31.9 BIPOLAR 1 DISORDER (HCC): Primary | ICD-10-CM

## 2024-01-22 DIAGNOSIS — F43.12 POST-TRAUMATIC STRESS DISORDER, CHRONIC: ICD-10-CM

## 2024-01-22 DIAGNOSIS — F15.21 METHAMPHETAMINE USE DISORDER, SEVERE, IN EARLY REMISSION (HCC): ICD-10-CM

## 2024-01-22 DIAGNOSIS — Z56.3 STRESSFUL WORK SCHEDULE: ICD-10-CM

## 2024-01-22 DIAGNOSIS — F17.200 TOBACCO USE DISORDER: ICD-10-CM

## 2024-01-22 DIAGNOSIS — G47.09 OTHER INSOMNIA: ICD-10-CM

## 2024-01-22 DIAGNOSIS — F31.4 BIPOLAR DISORDER, CURRENT EPISODE DEPRESSED, SEVERE, WITHOUT PSYCHOTIC FEATURES (HCC): ICD-10-CM

## 2024-01-22 DIAGNOSIS — F12.90 MARIJUANA USE, CONTINUOUS: ICD-10-CM

## 2024-01-22 DIAGNOSIS — Z51.81 ENCOUNTER FOR THERAPEUTIC DRUG MONITORING: ICD-10-CM

## 2024-01-22 DIAGNOSIS — F41.9 ANXIETY: ICD-10-CM

## 2024-01-22 PROCEDURE — G0176 OPPS/PHP;ACTIVITY THERAPY: HCPCS

## 2024-01-22 PROCEDURE — G0410 GRP PSYCH PARTIAL HOSP 45-50: HCPCS

## 2024-01-22 PROCEDURE — 99214 OFFICE O/P EST MOD 30 MIN: CPT | Performed by: NURSE PRACTITIONER

## 2024-01-22 PROCEDURE — G0177 OPPS/PHP; TRAIN & EDUC SERV: HCPCS

## 2024-01-22 RX ORDER — DIVALPROEX SODIUM 500 MG/1
1000 TABLET, EXTENDED RELEASE ORAL
Qty: 60 TABLET | Refills: 1 | Status: SHIPPED | OUTPATIENT
Start: 2024-01-22

## 2024-01-22 RX ORDER — DIVALPROEX SODIUM 500 MG/1
500 TABLET, EXTENDED RELEASE ORAL DAILY
Qty: 30 TABLET | Refills: 1 | Status: SHIPPED | OUTPATIENT
Start: 2024-01-22

## 2024-01-22 RX ORDER — RISPERIDONE 2 MG/1
2 TABLET ORAL 2 TIMES DAILY
Qty: 60 TABLET | Refills: 1 | Status: SHIPPED | OUTPATIENT
Start: 2024-01-22

## 2024-01-22 SDOH — HEALTH STABILITY - MENTAL HEALTH: STRESSFUL WORK SCHEDULE: Z56.3

## 2024-01-22 NOTE — PSYCH
\Subjective:     Patient ID: Garcia Cuevas is a 43 y.o. male.    [facility] Clinical Progress Notes      Specialized Services Documentation  Therapist must complete separate progress note for each specific clinical activity in which the individual participated during the day.     Education Therapy   [phone] Garcia Cuevas actively shared in morning assessment and goal review. Presented as Receptive related to readiness to learn.  Garcia Cuevas did complete goal from last treatment day identifying gaining hope, responsibility, and self support. did not present with any barriers to learning.     [phone] Garcia Cuevas engaged throughout the treatment day. Was engaged in learning related to Illness, Medication, Aftercare, and Wellness Tools. Staff utilized Verbal, Written, A/V, and Demonstration teaching methods.  Garcia Cuevas shared area of learning and set a goal for outside of program to better interactions with people at home.      Tx Plan Objective: 1.1,1.2, 1.4   Therapist: Kt Price MS    Other UR completed with Eddie [phone]. PHP UR extended from 1/19 until 1/22 due to absence. 4 PHP approved from 1/22/24 until 1/26/24. Same auth number  tx plan revised and signed    Case Management Note    Kt Price MS    Current suicide risk : Low     [phone] & [phone] This writer met with Garcia to discuss discharge, revise the treatment plan, and review progress. Garcia was excited to discharge this Friday and start working again on Sunday. Garcia reviewed several gains from program and repeatedly thanked this writer for the program and all we've done for him.    Medications changes/added/denied? Yes    Treatment session number: 7    Individual Case Management Visit provided today? Yes     [facility] follow up physician's orders: Medication check completed today. Please see note from ANIRUDH Au for more details.

## 2024-01-22 NOTE — PSYCH
Subjective:      Patient ID:+Garcia Cuevas 43 y.o. male     [facility] Progress Notes       Specialized Services Documentation  Therapist must complete separate progress note for each specific clinical activity in which the individual participated during the day.         Education Therapy        Time 1230 - 1330 - Garcia Cuevas attended the psychoeducation group on Self Esteem. Group members were educated by this writer on Self Esteem - the definition, the importance of and the barriers to same. In addition, education was given on ways to begin to recognize and value their individual strengths to cultivate and improve their Self Esteem.  This writer encouraged the members of group to continue utilizing the skills learned and to keep a list of the strengths they have as well as strengths they want to cultivate.         Garcia Cuevas displayed understanding through engagement in the topic with the group . For Garcia Cuevas,  GOOD progress toward goals was noted, through their engagement in group. Continue psychotherapy to encourage self-awareness and home practice of skills to support wellness.    Treatment Plan Objective 1.1, 1.2, 1.3, 1.4 Therapist: Pat Flynn RN and co-lead Demetrio Galindo

## 2024-01-22 NOTE — PSYCH
Subjective:     Patient ID: Garcia Cuevas 43 y.o. Male    [facility] Clinical Progress Notes      Specialized Services Documentation  Therapist must complete separate progress note for each specific clinical activity in which the individual participated during the day.     Group Psychotherapy - Open Process    [id number] Garcia Cuevas actively participated in an open processing group on increasing empowerment and having an opportunity to be heard when one might feel isolated in sharing their experiences.  spent time engaging group participants with open ended questions, reflective questions, and encouragement from other group members. In addition, it is important for the group to be able to receive multiple perspectives and feedback from other group members in a safe environment.  provided space for members to share as they felt comfortable, active listening, encouragement, and offered support to group when warranted. This structure helped support the group in feeling cathartic, gain some interpersonal learning, group cohesiveness, altruism, and instilling hope. Garcia Cuevas contributed to discussion by sharing about the topic discussed, relating to group members, and allowing self to be open/vulnerable. Garcia Cuevas shared about his experience with struggling to accept certain things and how it impacts certain relationships at home. He also demonstrated empathy and support to others in group.  good  progress noted towards goal. Continue with open processing therapy to provide space to support individuals in building trust, gain corrective emotional experiences, and cultivate healthier inter-dependency with others.    Tx Plan Objective 1.1, 1.2, 1.4 Therapist: BRANDI Bond and Pat Flynn RN.

## 2024-01-22 NOTE — PSYCH
Psychotherapy Group    Subjective:     Patient ID: Garcia Cuevas 43 y.o.     This group was co-facilitated in a private office with Janet Tamez  ([phone])Garcia Cuevas actively engaged in psychoeducational group about the PLEASE skill. The skill helps to maintain and regulate emotional expression/regulation. Group discovered strategies that help them to take care of physical and emotional well being on a short term and long term basis. The group talked about understanding the purpose, and meaning of self care in regards to physical, intellectual, and emotional expression, regulation , and recognition, and how it affects themselves and others.. Teaching on the emphasis of self monitoring and self-care, who the group can go to for help was brought up as well. Group was encouraged to ask questions in an open forum at the end of group. Positive progress displayed through engagement in topic.Positive progress displayed through engagement in topic. Pt will continue to engage in psychotherapy to encourage self realization in treatment.  Treatment Plan Objective 1.1, 1.2, 1.3, 1.4 Therapist: Demetrio CARDOZO Ed.

## 2024-01-22 NOTE — PSYCH
PHP MEDICATION MANAGEMENT NOTE        [facility]    Name and Date of Birth:  Garcia Cuevas 43 y.o. 1980 MRN: [id number]    Date of Visit: January 22, 2024    Visit Time    Visit Start Time: 1000   Visit Stop Time: 1030  Total Visit Duration:  30 minutes     The total visit duration detailed above includes: patient engagement, medication management, psychotherapy/counseling, discussion regarding treatment goals, and coordination of care.      Note Share Disclaimer:     This note was not shared with the patient due to reasonable likelihood of causing patient harm    No Known Allergies  SUBJECTIVE:    Garcia is seen today for a follow up for Bipolar Disorder and Generalized Anxiety Disorder. He continues to experience on and off symptoms since beginning PHP.  Today reports some improvement in his depressive symptoms since beginning the partial, less tearful and less hopeless.  Denying any active suicidal thoughts or passive death wish.  Did note some ongoing mood lability over the weekend and feels he overreacts to situations, easily angered or easily overwhelmed.  He did have his valproic acid level done last week, level was 53.  We did discuss this is a low normal therapeutic level, but symptoms may improve with increased dose and increasing his level.  He is agreeable to increasing his Depakote to 500 in the morning and continuing to 1000 mg at bedtime.  He does feel Risperdal 2 mg twice daily has been effective for agitation and paranoia, notes that he must take it 12 hours apart or it is too sedating.  He anticipates discharge from the [facility] this week.  He will follow-up with [facility] psychiatry in 1.  I am asking him to get a valproic acid level and also CMP and CBC in the next 2 weeks, before his follow-up appointment.  Provided refills of his Depakote and Risperdal.    He denies any side effects from current psychiatric medications.    PLAN:  I    Increase Depakote ER to 500 mg in the a.m. and 1000 mg at bedtime   Continue Risperdal 2 mg twice daily    Aware of 24 hour and weekend coverage for urgent situations accessed by calling [facility] main practice number  Continue partial hospitalization program    Diagnoses and all orders for this visit:    Bipolar 1 disorder (HCC)  -     Lithium level; Future  -     COMPREHENSIVE METABOLIC PANEL(12); Future  -     CBC and differential; Future  -     divalproex sodium (Depakote ER) 500 mg 24 hr tablet; Take 1 tablet (500 mg total) by mouth daily Take one tablet in AM in addition to scheduled HS dose of 1000 mg  -     divalproex sodium (DEPAKOTE ER) 500 mg 24 hr tablet; Take 2 tablets (1,000 mg total) by mouth daily at bedtime Take 1000 mg at HS in addition to AM dose of 500 mg  -     risperiDONE (RisperDAL) 2 mg tablet; Take 1 tablet (2 mg total) by mouth 2 (two) times a day    Encounter for therapeutic drug monitoring  -     Lithium level; Future  -     COMPREHENSIVE METABOLIC PANEL(12); Future  -     CBC and differential; Future    Post-traumatic stress disorder, chronic    Other insomnia    Bipolar disorder, current episode depressed, severe, without psychotic features (HCC)    Anxiety    Stressful work schedule        Current Outpatient Medications on File Prior to Visit   Medication Sig Dispense Refill    VITAMIN D PO Take 50,000 Units by mouth once a week      [DISCONTINUED] divalproex sodium (DEPAKOTE ER) 500 mg 24 hr tablet Take 2 tablets (1,000 mg total) by mouth daily at bedtime 60 tablet 0    [DISCONTINUED] risperiDONE (RisperDAL) 2 mg tablet Take 1 tablet (2 mg total) by mouth 2 (two) times a day 60 tablet 0     No current facility-administered medications on file prior to visit.       HPI ROS Appetite Changes and Sleep:     He reports fluctuating sleep pattern, fluctuating appetite, fluctuating energy levels. Denies homicidal ideation, denies suicidal ideation    Review Of  Systems:   no complaints, all other systems are negative         Mental Status Evaluation:    Appearance:  age appropriate   Behavior:  pleasant, cooperative   Speech:  normal rate, normal volume   Mood:  improved   Affect:  normal range and intensity   Thought Process:  goal directed   Associations: intact associations   Thought Content:  no overt delusions   Perceptual Disturbances: none   Risk Potential: Suicidal ideation - None  Homicidal ideation - None  Potential for aggression - No   Sensorium:  oriented to person, place, time/date, and situation   Memory:  recent and remote memory grossly intact   Consciousness:  alert and awake   Attention: decreased concentration and decreased attention span   Insight:  improving   Judgment: improving   Gait/Station: normal gait/station, normal balance   Motor Activity: no abnormal movements       History Review:The following portions of the patient's history were reviewed and updated as appropriate: psychiatric history, trauma history allergies, current medications, past family history, past medical history, past social history, past surgical history, and problem list   OBJECTIVE:     Vital signs in last 24 hours:    There were no vitals filed for this visit.  Laboratory Results: I have personally reviewed all pertinent laboratory/tests results.    Medications Risks/Benefits:      Risks, Benefits And Possible Side Effects Of Medications:    Discussed risks and benefits of treatment with patient including risk of parkinsonian symptoms, metabolic syndrome, tardive dyskinesia and neuroleptic malignant syndrome related to treatment with antipsychotic medications and risk of liver impairment related to treatment with Depakote     Controlled Medication Discussion:     Not applicable - controlled prescriptions are not prescribed by this practice    ANIRUDH Au 01/22/24

## 2024-01-22 NOTE — BH TREATMENT PLAN
Assessment/Plan:          Diagnoses and all orders for this visit:     Bipolar 1 disorder (HCC)     PTSD (post-traumatic stress disorder)     Marijuana use, continuous     Tobacco use disorder     Methamphetamine use disorder, severe, in early remission (HCC)              Subjective:      Patient ID: Garcia Cuevas  is a 43 y.o. male .     [facility] Treatment Plan   AREAS OF NEED: Garcia has been struggling with symptoms related to bipolar I and PTSD as evidenced by recent anger outbursts, poor sleep, reduced appetite, and feelings of guilt/shame due to lack of mental health treatment/past traumas/recent stressors.   Date Initiated: 01/10/24     Strengths: Helping others     LONG TERM GOAL:   Date Initiated: 01/10/24  1.0 While at [facility], I will gain insights/supports/education/skills which I can utilize daily to reduce my symptoms and increase my quality of life.  Target Date: 02/07/24  Completion Date:         SHORT TERM OBJECTIVES:      Date Initiated: 01/10/24  1.1 I will learn and practice at least 3 different ways to present and reinforce boundaries. (ie DEAR MAN acronym, GIVE acronym, FAST acronym, Wise Mind, etc.)  Revision Date: 1/22/24 (revision delayed due to absence)  Target Date: 01/19/24  Completion Date:      Date Initiated: 01/10/24  1.2 I will learn and practice at least 3 new/effective coping skills which I can use as often as necessary to improve the ways in which I express myself. (ie STOP acronym, Cognitive Distortion work, CBT thought records, journaling, TIPP acronym, Emotional Freedom Technique, etc.)  Revision Date: 1/22/24 (revision delayed due to absence)  Target Date:  01/19/24  Completion Date:     Date Initiated: 01/10/24  1.3 I will take medications as prescribed and share questions and concerns if they arise.    Revision Date: 1/22/24 (revision delayed due to absence)  Target Date:  01/19/24  Completion Date:      Date Initiated: 01/10/24  1.4 I will identify 3 ways my supports  can assist in my recovery and agree to use them when/if needed.    Revision Date: 1/22/24 (revision delayed due to absence)  Target Date:  01/19/24  Completion Date:            7 DAY REVISION:     Date Initiated: 1/22/19  1.5 I will complete my Wellness Recovery Action Plan prior to discharge from [facility].  Revision Date:   Target Date: 1/31/24  Completion Date:           PSYCHIATRY:  Date Initiated: 01/10/24  Medication Management and Education       Revision Date: 1/22/24      The person(s) responsible for carrying out the plan is Carlota Staton MD & ANIRUDH Au     NURSING/SYMPTOM EDUCATION:  Date Initiated: 01/10/24      1.1, 1.2. 1.3, 1.4 Provide wellness/symptoms and skill education groups three to five days weekly to educate Garcia Cuevas on signs and symptoms of diagnoses, skills to manage stressors, and medication questions that will be addressed by the treatment team.        Revision date: 01/22/24        1.1,1.2,1.3,1.4,1.5 Continue to encourage Garcia Cuevas to participate in wellness groups daily to learn about symptoms, coping strategies and warning signs to promote relapse prevention.             The person(s) responsible for carrying out the plan is Kt Price MS & Pat Flynn RN     PSYCHOLOGY:   Date Initiated: 01/10/24       1.1, 1.2, 1.4 Provide psychotherapy group 5 times per week to allow opportunity for Garcia Cuevas  to explore stressors and ways of coping.   Revision Date: 01/22/24   1.1,1.2,1.4,1.5  Continue to provide psychotherapy group daily to Garcia A Cuevas and encourage sharing of stressors, skills and positive change.     The person(s) responsible for carrying out the plan is BRANDI Morrison & KARI Dorsey     ALLIED THERAPY:   Date Initiated: 01/10/24  1.1,1.2 Engage Garcia Cuevas in AT group 5 times daily to encourage development and use of wellness tools to decrease symptoms and promote recovery through meaningful activity.  Revision Date:  01/22/24   1.1,1.2,1.5 Continue to engage Garcia RAMÍREZ Cuevas to participate in AT group to practice wellness tools within program and transfer to home sharing successes and barriers through healthy task involvement.         The person(s) responsible for carrying out the plan is Ernestina Pineda MTRickyBC & Leonor Dougherty MT-BC     CASE MANAGEMENT:   Date Initiated: 01/10/24      1.0 This  will meet with Garcia Cuevas  3-4 times weekly to assess treatment progress, discharge planning, connection to community supports and UR as indicated.  Revision Date:  01/22/24   1.0 Continue to meet with Garcia Cuevas 3-4 times weekly to assess growth, work toward goals, continued treatment needs, dc planning and use of supports.    The person(s) responsible for carrying out the plan is Kt Price MS     TREATMENT REVIEW/COMMENTS: 1/22/24- Garcia has made significant progress throughout his time at [facility]. He engages in group consistently/appropriately, has gained insights, practices program concepts/skills, and routinely sets/achieves goals both in and outside of program.     DISCHARGE CRITERIA: Identify 3 signs of progress and complete relapse prevention plan.    DISCHARGE PLAN: Connect with identified outpatient providers.   Estimated Length of Stay: 10 treatment days          Diagnosis and Treatment Plan explained to   Garcia RAMÍREZ Mason   . Garcia RAMÍREZ Cuevas  relates understanding diagnosis and is agreeable to Treatment Plan.         CLIENT COMMENTS / Please share your thoughts, feelings, need and/or experiences regarding your treatment plan with Staff.  Please see follow up note with comments.        Signatures can be found on [facility] Treatment plan consent form.

## 2024-01-23 ENCOUNTER — OFFICE VISIT (OUTPATIENT)
Dept: PSYCHOLOGY | Facility: CLINIC | Age: 44
End: 2024-01-23
Payer: COMMERCIAL

## 2024-01-23 DIAGNOSIS — F43.10 PTSD (POST-TRAUMATIC STRESS DISORDER): ICD-10-CM

## 2024-01-23 DIAGNOSIS — F12.90 MARIJUANA USE, CONTINUOUS: ICD-10-CM

## 2024-01-23 DIAGNOSIS — F15.21 METHAMPHETAMINE USE DISORDER, SEVERE, IN EARLY REMISSION (HCC): ICD-10-CM

## 2024-01-23 DIAGNOSIS — F31.9 BIPOLAR 1 DISORDER (HCC): Primary | ICD-10-CM

## 2024-01-23 DIAGNOSIS — F17.200 TOBACCO USE DISORDER: ICD-10-CM

## 2024-01-23 PROCEDURE — G0176 OPPS/PHP;ACTIVITY THERAPY: HCPCS

## 2024-01-23 PROCEDURE — G0177 OPPS/PHP; TRAIN & EDUC SERV: HCPCS

## 2024-01-23 PROCEDURE — G0410 GRP PSYCH PARTIAL HOSP 45-50: HCPCS

## 2024-01-23 NOTE — PSYCH
Subjective:    Patient ID: Garcia Cuevas is a 43 y.o. male      [facility] Clinical Progress Notes      Specialized Services Documentation  Therapist must complete separate progress note for each specific clinical activity in which the individual participated during the day.       Allied Therapy ([id number]) Garcia Cuevas was actively involved in group focused on effective communication during disagreements.   introduced 9 Fair Fighting Rules which described boundaries, warning signs, and techniques for handling disagreements. Group then transitioned into an open discussion on how the fair fighting rules could apply to future conflicts. Garcia shared area of improvement to include no sarcastic comments or yelling. Good effort noted towards treatment plan goals. Continue to involve in group to explore wellness tools to handle disagreements safely. Tx Plan Objective: 1.1, 1.2, 1.4, Therapist:  AAYUSH Carey    Education Therapy   [id number] Garcia Cuevas actively shared in check in and goal review. Presented as Receptive related to readiness to learn.  Garcia Cuevas  did complete goal from last treatment day identifying gaining hope and support. did not present with any barriers to learning.     Tx Plan Objective: 1.1, 1.2, 1.4, Therapist:  AAYUSH Carey

## 2024-01-23 NOTE — PSYCH
Subjective:     Patient ID: Garcia Cuevas 43 y.o. male          [facility] Clinical Progress Notes      Specialized Services Documentation  Therapist must complete separate progress note for each specific clinical activity in which the individual participated during the day.      Education Therapy     [id number] Garcia engaged throughout the treatment day. Was engaged in learning related to Illness, Medication, Aftercare and Wellness Tools. Staff utilized Verbal, Written, A/V and Demonstration teaching methods. Garcia Cuevas shared area of learning and set a goal for outside of program for self care    Pat Flynn RN

## 2024-01-23 NOTE — PSYCH
Visit Time    Visit Start Time: 1045  Visit Stop Time: 1145  Total Visit Duration: 60 minutes    Subjective:     Patient ID: Garcia Cuevas is a 43 y.o. y.o. male.    [facility] Clinical Progress Notes      Specialized Services Documentation  Therapist must complete separate progress note for each specific clinical activity in which the individual participated during the day.     Group Psychotherapy  Garcia Cuevas actively shared in psychotherapy group focused on coping with worry.  Garcia was encouraged to distinguish healthy versus unhealthy worries. He engaged in progressive muscle relaxation exercise. Concepts including worry time, paced breathing, and ACCEPTS explored.  Positive progress voiced toward goal.  Continue psychotherapy to engage in the development and practice of wellness tools.    Tx Plan Objective: 1.1 Therapist: BETTY Vela & Pat Flynn RN

## 2024-01-23 NOTE — PSYCH
Subjective:     Patient ID: Garcia Cuevas is a 43 y.o. male.    [facility] Clinical Progress Notes      Specialized Services Documentation  Therapist must complete separate progress note for each specific clinical activity in which the individual participated during the day.     Group Psychotherapy [id number]     This group consisted of roughly 20 minutes which focused on the science/benefits of yoga/Jj Chi (with a Jj Chi practice included) and 40 minutes of learning the science behind/practicing Emotional Freedom Tapping. The EFT discussion began with a conversation focusing around the understanding the science behind emotional freedom tapping and in which ways it can help improve lives (sleep, stress, diet, etc). We then discussed the technique in detail and practiced it multiple times as a group.  Everyone was also provided with EFT/Yoga packets which described the practice and efficacy of each. Following reflecting on the EFT practice, the group then began discussion on the research-based benefits of yoga and Jj Chi. We then practiced Jj Chi as a group. The Jj Chi practice was also followed by a period of reflection/discussion. Garcia Cuevas participated by sharing his experience with EFT, shared his knowledge of the sore spot, engaged in each practice, and promoted discussion. Garcia continues to work on treatment goals. Continue psychotherapy groups to encourage further exploration of needs, personal awareness, and skills.     Tx Plan Objective: 1.1,1.2, 1.4   Therapist: Kt Price MS    Case Management Note    Kt Price MS    Current suicide risk : Low     Not a case management day for Garcia Cuevas today. Did not reach out with any additional questions and concerns and aware of next scheduled 1:1.     Medications changes/added/denied? No    Treatment session number: 8    Individual Case Management Visit provided today? No    [facility] follow up physician's orders: None, next  medication check will be next week with ANIRUDH Au.

## 2024-01-24 ENCOUNTER — OFFICE VISIT (OUTPATIENT)
Dept: PSYCHOLOGY | Facility: CLINIC | Age: 44
End: 2024-01-24
Payer: COMMERCIAL

## 2024-01-24 DIAGNOSIS — F43.10 PTSD (POST-TRAUMATIC STRESS DISORDER): ICD-10-CM

## 2024-01-24 DIAGNOSIS — F15.21 METHAMPHETAMINE USE DISORDER, SEVERE, IN EARLY REMISSION (HCC): ICD-10-CM

## 2024-01-24 DIAGNOSIS — F17.200 TOBACCO USE DISORDER: ICD-10-CM

## 2024-01-24 DIAGNOSIS — F12.90 MARIJUANA USE, CONTINUOUS: ICD-10-CM

## 2024-01-24 DIAGNOSIS — F31.9 BIPOLAR 1 DISORDER (HCC): Primary | ICD-10-CM

## 2024-01-24 PROCEDURE — G0410 GRP PSYCH PARTIAL HOSP 45-50: HCPCS

## 2024-01-24 PROCEDURE — G0176 OPPS/PHP;ACTIVITY THERAPY: HCPCS

## 2024-01-24 PROCEDURE — G0177 OPPS/PHP; TRAIN & EDUC SERV: HCPCS

## 2024-01-24 NOTE — PSYCH
Subjective:     Patient ID: Garcia Cuevas 43 y.o. Male    [facility] Clinical Progress Notes      Specialized Services Documentation  Therapist must complete separate progress note for each specific clinical activity in which the individual participated during the day.     Case Management Note    BRANDI Bond     Current suicide risk : Low     A case management session is not scheduled today with Garcia Cuevas ; additionally, they did not request a CM meeting.  Garcia Cuevas is aware of next scheduled 1:1.    Medications changes/added/denied? N/a     Treatment session number: 9    Individual Case Management Visit provided today? No    [facility] follow up physician's orders: None at this time

## 2024-01-24 NOTE — PSYCH
Subjective:    Patient ID: Garcia Cuevas is a 43 y.o. male      [facility] Clinical Progress Notes      Specialized Services Documentation  Therapist must complete separate progress note for each specific clinical activity in which the individual participated during the day.       Allied Therapy ([id number]) Garcia Cuevas actively engaged in a group that started with recalling tasks completed each day as part of a routine. After, the group discussed the importance of establishing a daily routine and reviewed both healthy and unhealthy habits. Group then completed “Planning Your Routine” worksheet to address wants, identify barriers, create a plan to overcome barriers, and ways to reward success. Garcia shared practicing self-care is something he would like to add to his routine. Garcia was able to identify specific activities he would like to engage in. Positive progress made towards treatment plan. Continue MH groups to explore healthy habits and wellness strategies. Tx Plan Objective: 1.1, 1.2, 1.3, 1.4, Therapist:  AAYUSH Carey    Education Therapy   [id number] Garcia Cuevas actively shared in check in and goal review. Presented as Receptive related to readiness to learn.  Garcia Cuevas  did complete goal from last treatment day identifying gaining responsibility and hope. did not present with any barriers to learning.     Tx Plan Objective: 1.1, 1.2, 1.4, Therapist:  AAYUSH Carey

## 2024-01-24 NOTE — PSYCH
Subjective:     Patient ID: Garcia Cuevas 43 y.o. Male    [facility] Progress Notes      Specialized Services Documentation  Therapist must complete separate progress note for each specific clinical activity in which the individual participated during the day.     Group Psychotherapy - Radical Acceptance     [id number] Garcia Cuevas remained quiet throughout  participated in a psychoeducational group about Radical Acceptance. Group facilitator provided the definition of radical acceptance and discussed key points:    Acceptance doesn't mean resignation  Radical acceptance is letting go of the need to control, , and wish things were different than they are   Fighting against negative emotions leads do our suffering     The group engaged in the discussion of the differentiation between pain and suffering, myths about radical acceptance, willingness (doing what is just needed) vs. willfulness (refusing control/giving up) and turning the mind. The group was led in a discussion about when to use radical acceptance, when it is unhelpful, discussed examples of using radical acceptance, and were provided a worksheet on “10 Steps for Practicing Radical Acceptance Using DBT,” and affirmations to shift their mindset to more acceptance. Lastly, the group was provided with the skill ACCEPTS to also practice radical acceptance (Activities, Contributing,Comparisons, Emotions, Pushing away, Thoughts, Sensations), and discussed each word to help them have strategies for distracting oneself from distressing emotions, giving them time to lessen in intensity, or fade away.  used the following structure to support and lead the psychoeducation, group worksheets, open discussion/processing, and peer support. some  progress as evidenced by engaged nonverbally as evidenced by taking notes, maintaining eye contact, open body language, and overall attentiveness. Continue with psychoeducation to further release self  of emotional pain and suffering while increasing their tolerance to consciously acknowledge and honor difficult situation and emotions.      Tx Plan Objective 1.1, 1.2, 1.4 Therapist: BRANDI Bond.

## 2024-01-24 NOTE — PSYCH
Subjective:     Patient ID: Garcia Cuevas 43 y.o.     [facility] Progress Notes      Specialized Services Documentation  Therapist must complete separate progress note for each specific clinical activity in which the individual participated during the day.     Education Therapy         [id number] Garcia Cuevas engaged throughout the treatment day. Was engaged in learning related to Illness, Medication, Aftercare and Wellness Tools.  Staff utilized verbal, written, and demonstration teaching methods. Garcia Cuevas shared area of learning and set a goal for outside of program to work on self care.     Tx Plan Objective: 1.1, 1.2, 1.4, Therapist: Chely CARDOZO Ed.    Group Psychotherapy    Subjective:     Patient ID: Garcia Cuevas 43 y.o.    This group was co-facilitated in a private office with Pat Flynn  (12:30-13:30) Garcia Cuevas actively  engaged in psychoeducational group about unconventional coping strategies. The skills introduced help to maintain and regulate emotions and create healthy non-conventional coping skills. Group discovered strategies that help them to manage emotional stress and create unconventional coping strategies that will help emotional regulation on a short term basis.The group talked about understanding the purpose, and meaning of emotional regulation and the importance of emotional expression, regulation , and recognition, and how it affects themselves and others. Teaching on the skill process of unconventional coping strategies and DBT self-care, members of the group are able to manage short term situations with varied direct tangible coping stratiges. Group was encouraged to ask questions in an open forum at the end of group. Positive progress displayed through engagement in topic. Garcia Cuevas will continue to engage in psychotherapy to encourage positive self realization.  Treatment Plan Objective 1.1, 1.2, 1.3, 1.4 Therapist: Demetrio CARDOZO Ed.

## 2024-01-24 NOTE — PSYCH
Subjective:     Patient ID: Garcia Cuevas 43 y.o. Male    [facility] Progress Notes      Specialized Services Documentation  Therapist must complete separate progress note for each specific clinical activity in which the individual participated during the day.     Group Psychotherapy - Open Process    [id number] Garcia Cuevas actively participated in an open processing group on increasing empowerment and having an opportunity to be heard when one might feel isolated in sharing their experiences.  spent time engaging group participants with open ended questions, reflective questions, and encouragement from other group members. In addition, it is important for the group to be able to receive multiple perspectives and feedback from other group members in a safe environment.  provided space for members to share as they felt comfortable, active listening, encouragement, and offered support to group when warranted. This structure helped support the group in feeling cathartic, gain some interpersonal learning, group cohesiveness, altruism, and instilling hope. Garcia Cuevas contributed to discussion by sharing about the topic discussed, relating to group members, and allowing self to be open/vulnerable.  good  progress noted towards goal. Continue with open processing therapy to provide space to support individuals in building trust, gain corrective emotional experiences, and cultivate healthier inter-dependency with others.    Tx Plan Objective 1.1, 1.2, 1.4 Therapist: BRANDI Bond and AAYUSH Carey

## 2024-01-25 ENCOUNTER — TELEPHONE (OUTPATIENT)
Dept: PSYCHIATRY | Facility: CLINIC | Age: 44
End: 2024-01-25

## 2024-01-25 ENCOUNTER — OFFICE VISIT (OUTPATIENT)
Dept: PSYCHOLOGY | Facility: CLINIC | Age: 44
End: 2024-01-25
Payer: COMMERCIAL

## 2024-01-25 DIAGNOSIS — F12.90 MARIJUANA USE, CONTINUOUS: ICD-10-CM

## 2024-01-25 DIAGNOSIS — F31.9 BIPOLAR 1 DISORDER (HCC): Primary | ICD-10-CM

## 2024-01-25 DIAGNOSIS — F15.21 METHAMPHETAMINE USE DISORDER, SEVERE, IN EARLY REMISSION (HCC): ICD-10-CM

## 2024-01-25 DIAGNOSIS — F43.10 PTSD (POST-TRAUMATIC STRESS DISORDER): ICD-10-CM

## 2024-01-25 DIAGNOSIS — F17.200 TOBACCO USE DISORDER: ICD-10-CM

## 2024-01-25 PROCEDURE — 90832 PSYTX W PT 30 MINUTES: CPT

## 2024-01-25 PROCEDURE — G0410 GRP PSYCH PARTIAL HOSP 45-50: HCPCS

## 2024-01-25 PROCEDURE — G0177 OPPS/PHP; TRAIN & EDUC SERV: HCPCS

## 2024-01-25 PROCEDURE — G0176 OPPS/PHP;ACTIVITY THERAPY: HCPCS

## 2024-01-25 NOTE — PSYCH
Subjective:     Patient ID: Garcia Cuevas is a 43 y.o. male.    [facility] Clinical Progress Notes      Specialized Services Documentation  Therapist must complete separate progress note for each specific clinical activity in which the individual participated during the day.     Case Management Note    Kt Price MS    Current suicide risk : Low     1040-11 This writer met with Garcia for case management. We signed his safety plan, completed the PHQ9, and discussed progress at length    Medications changes/added/denied? No    Treatment session number: 10    Individual Case Management Visit provided today? Yes     [facility] follow up physician's orders: Discharge tomorrow

## 2024-01-25 NOTE — PSYCH
Subjective:     Patient ID: Garcia Cuevas 43 y.o. Male    [facility] Clinical Progress Notes      Specialized Services Documentation  Therapist must complete separate progress note for each specific clinical activity in which the individual participated during the day.     GROUP PSYCHOTHERAPY - NEGRITO     ([id number]) Certified Peer Specialist (CPS), Shayla shared her life story as she co-led this session. Garcia Cuevas  attentively listened to ELISA Mcguire. This group encouraged power of learning about self, accepting illness and personal responsibility in recovery. Shalya also shared the acronym she uses to remind her about what tools she can use on a daily basis to maintain mental health wellness. Garcia Cuevas was observed being engaged, but a lot on his phone, needing redirection. Community resources reviewed in addition to personal resources like the affirmations.  NEGRITO folder handed out to group members with local resources/support groups. some  progress toward goals noted. Continue with successful d/c on Friday 1/26/2024.    TX Plan Objectives: 1.1, 1.2, 1.3, 1.4   Therapist: BRANDI Bond

## 2024-01-25 NOTE — TELEPHONE ENCOUNTER
Patient due for PHP D/C has been scheduled for med mgmt and talk therapy       Talk Therapy: Sheryl Leonard 2/15 @ 9a   Medication Management: Adama Card 2/21 @ 130p

## 2024-01-25 NOTE — TELEPHONE ENCOUNTER
Patient's talk therapy appt c/x due to provider schedule change patient has been r/s for talk therapy    C/X: Sheryl mcconnell 2/15 @ 9a  R/S: Sheryl mcconnell 2/14 @ 2p

## 2024-01-25 NOTE — PSYCH
"  Subjective:      Patient ID:+Garcia Cuevas 43 y.o. male     [facility] Progress Notes       Specialized Services Documentation  Therapist must complete separate progress note for each specific clinical activity in which the individual participated during the day.         Education Therapy        Time 1230 to 1330 - Garcia Cuevas attended the psychoeducation group on Discharge Planning / Information. Group members were educated by this writer on the discharge process from PHP - transitioning to outpatient services, choosing a therapist and psychiatrist, planning for life after discharge from [facility], concerns and feelings towards discharge, and the barriers to same. In addition, the importance of making and keeping appointments was discussed, as well as the importance of being prepared for appointments and to work with their doctors and therapists as an active team member. The group discussed the challenges and benefits of being discharged.         Garcia Cuevas displayed understanding through engagement in the topic with the group . For Garcia Cuevas,  GOOD  progress toward goals was noted, through their engagement in group. Continue psychotherapy to encourage self-awareness and home practice of skills to support wellness.    Treatment Plan Objective 1.1, 1.2, 1.3, 1.4 Therapist: Pat Flynn RN      [id number] Garcia engaged throughout the treatment day. Was engaged in learning related to Illness, Medication, Aftercare and Wellness Tools. Staff utilized Verbal, Written, A/V and Demonstration teaching methods. Garcia Cuevas shared area of learning and set a goal for outside of program \"to sleep\".              "

## 2024-01-25 NOTE — PSYCH
Subjective:    Patient ID: Garcia Cuevas is a 43 y.o. male      [facility] Progress Notes      Specialized Services Documentation  Therapist must complete separate progress note for each specific clinical activity in which the individual participated during the day.       Allied Therapy ([id number]) Garcia Cuevas verbally engaged in group discussion related to body image. Topics included influences on body image, comments regarding appearance, unrealistic comparisons, and any negative thoughts regarding body image. Garcia shared nicknames he's been called regarding his height during group activity. Garcia identified he has healthy body image. Group discussed developing healthy habits, improving self-esteem, and participating in therapy as a course of action to promote healthy body image. Due to the sensitivity of the topic, the writer offered group members to express any concerns privately after group. Garcia reported no concerns and verbalized feeling safe before ending group. Positive effort noted towards treatment goal. Continue to involve in AT to view their body as a whole, rather than focusing on imperfections.   Tx Plan Objective: 1.1, 1.2, 1.4, Therapist:  AAYUSH Carey    Education Therapy   [id number] Garcia Cuevas actively shared in check in and goal review. Presented as Receptive related to readiness to learn.  Garcia Cuevas  did complete goal from last treatment day identifying gaining hope, responsibility, and support. did not present with any barriers to learning.     Tx Plan Objective: 1.1, 1.2, 1.4, Therapist:  AAYUSH Carey

## 2024-01-26 ENCOUNTER — OFFICE VISIT (OUTPATIENT)
Dept: PSYCHOLOGY | Facility: CLINIC | Age: 44
End: 2024-01-26
Payer: COMMERCIAL

## 2024-01-26 DIAGNOSIS — F15.21 METHAMPHETAMINE USE DISORDER, SEVERE, IN EARLY REMISSION (HCC): ICD-10-CM

## 2024-01-26 DIAGNOSIS — F31.9 BIPOLAR 1 DISORDER (HCC): Primary | ICD-10-CM

## 2024-01-26 DIAGNOSIS — F43.10 PTSD (POST-TRAUMATIC STRESS DISORDER): ICD-10-CM

## 2024-01-26 DIAGNOSIS — F12.90 MARIJUANA USE, CONTINUOUS: ICD-10-CM

## 2024-01-26 DIAGNOSIS — F17.200 TOBACCO USE DISORDER: ICD-10-CM

## 2024-01-26 PROCEDURE — G0410 GRP PSYCH PARTIAL HOSP 45-50: HCPCS

## 2024-01-26 PROCEDURE — G0177 OPPS/PHP; TRAIN & EDUC SERV: HCPCS

## 2024-01-26 PROCEDURE — G0176 OPPS/PHP;ACTIVITY THERAPY: HCPCS

## 2024-01-26 NOTE — PSYCH
[facility] Clinical Progress Notes      Specialized Services Documentation  Therapist must complete separate progress note for each specific clinical activity in which the individual participated during the day.       [facility] follow up physician's orders:   Date: 1/26/2024  Time: 9:51  DISCHARGE TODAY  Carlota Staton MD
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
## 2024-01-26 NOTE — PSYCH
Assessment/Plan:          Diagnoses and all orders for this visit:     Bipolar 1 disorder (HCC)     PTSD (post-traumatic stress disorder)     Marijuana use, continuous     Tobacco use disorder     Methamphetamine use disorder, severe, in early remission (HCC)        Subjective:      Patient ID: Garcia Cuevas  is a 43 y.o. male .     [facility] Treatment Plan   AREAS OF NEED: Garcia has been struggling with symptoms related to bipolar I and PTSD as evidenced by recent anger outbursts, poor sleep, reduced appetite, and feelings of guilt/shame due to lack of mental health treatment/past traumas/recent stressors.   Date Initiated: 01/10/24     Strengths: Helping others     LONG TERM GOAL:   Date Initiated: 01/10/24  1.0 While at [facility], I will gain insights/supports/education/skills which I can utilize daily to reduce my symptoms and increase my quality of life.  Target Date: 02/07/24  Completion Date: 01/26/24        SHORT TERM OBJECTIVES:      Date Initiated: 01/10/24  1.1 I will learn and practice at least 3 different ways to present and reinforce boundaries. (ie DEAR MAN acronym, GIVE acronym, FAST acronym, Wise Mind, etc.)  Revision Date: 1/22/24 (revision delayed due to absence)  Target Date: 01/19/24  Completion Date:  01/26/24     Date Initiated: 01/10/24  1.2 I will learn and practice at least 3 new/effective coping skills which I can use as often as necessary to improve the ways in which I express myself. (ie STOP acronym, Cognitive Distortion work, CBT thought records, journaling, TIPP acronym, Emotional Freedom Technique, etc.)  Revision Date: 1/22/24 (revision delayed due to absence)  Target Date:  01/19/24  Completion Date: 01/26/24     Date Initiated: 01/10/24  1.3 I will take medications as prescribed and share questions and concerns if they arise.    Revision Date: 1/22/24 (revision delayed due to absence)  Target Date:  01/19/24  Completion Date:  01/26/24     Date Initiated: 01/10/24  1.4 I will  identify 3 ways my supports can assist in my recovery and agree to use them when/if needed.    Revision Date: 1/22/24 (revision delayed due to absence)  Target Date:  01/19/24  Completion Date: 01/26/24            7 DAY REVISION:     Date Initiated: 1/22/19  1.5 I will complete my Wellness Recovery Action Plan prior to discharge from [facility].  Revision Date:    Target Date: 1/31/24  Completion Date:  01/26/24           PSYCHIATRY:  Date Initiated: 01/10/24  Medication Management and Education       Revision Date: 1/22/24      The person(s) responsible for carrying out the plan is Carlota Staton MD & ANIRUDH Au     NURSING/SYMPTOM EDUCATION:  Date Initiated: 01/10/24      1.1, 1.2. 1.3, 1.4 Provide wellness/symptoms and skill education groups three to five days weekly to educate Garcia Cuevas on signs and symptoms of diagnoses, skills to manage stressors, and medication questions that will be addressed by the treatment team.        Revision date: 01/22/24        1.1,1.2,1.3,1.4,1.5 Continue to encourage Garcia Cuevas to participate in wellness groups daily to learn about symptoms, coping strategies and warning signs to promote relapse prevention.             The person(s) responsible for carrying out the plan is Kt Price MS & Pat Flynn RN     PSYCHOLOGY:   Date Initiated: 01/10/24       1.1, 1.2, 1.4 Provide psychotherapy group 5 times per week to allow opportunity for Garcia Cuevas  to explore stressors and ways of coping.   Revision Date: 01/22/24   1.1,1.2,1.4,1.5  Continue to provide psychotherapy group daily to Garcia RAMÍREZ Cuevas and encourage sharing of stressors, skills and positive change.     The person(s) responsible for carrying out the plan is BRANDI Morrison & KARI Dorsey     ALLIED THERAPY:   Date Initiated: 01/10/24  1.1,1.2 Engage Garcia Cuevas in AT group 5 times daily to encourage development and use of wellness tools to decrease symptoms and promote recovery  "through meaningful activity.  Revision Date: 01/22/24   1.1,1.2,1.5 Continue to engage Garcia Cuevas to participate in AT group to practice wellness tools within program and transfer to home sharing successes and barriers through healthy task involvement.         The person(s) responsible for carrying out the plan is MEAGAN VelaBC & Leonor Dougherty MT-BC     CASE MANAGEMENT:   Date Initiated: 01/10/24      1.0 This  will meet with Garcia Cuevas  3-4 times weekly to assess treatment progress, discharge planning, connection to community supports and UR as indicated.  Revision Date:  01/22/24   1.0 Continue to meet with Garcia Cuevas 3-4 times weekly to assess growth, work toward goals, continued treatment needs, dc planning and use of supports.     The person(s) responsible for carrying out the plan is Kt Price MS     TREATMENT REVIEW/COMMENTS: 1/22/24- Garcia has made significant progress throughout his time at [facility]. He engages in group consistently/appropriately, has gained insights, practices program concepts/skills, and routinely sets/achieves goals both in and outside of program.  01/26/24- Successful discharge from [facility] today: After 11 days of PHP, Garcia has become observably far more grounded, thinks before he speaks, has gained multiple new insights, taken responsibility for his actions/his own recovery, improved relationships in the home, and interacts far more effectively with peers. Garcia reports utilizing the DBT skill \"STOP\" regularly and states that his current medication regiment is the most effective one he's ever had.      DISCHARGE CRITERIA: Identify 3 signs of progress and complete relapse prevention plan.    DISCHARGE PLAN: Connect with identified outpatient providers.   Estimated Length of Stay: 10 treatment days          Diagnosis and Treatment Plan explained to   Garcia Cuevas   . Garcia Cuevas  relates understanding diagnosis and is agreeable to Treatment " Plan.         CLIENT COMMENTS / Please share your thoughts, feelings, need and/or experiences regarding your treatment plan with Staff.  Please see follow up note with comments.        Signatures can be found on [facility] Treatment plan consent form.

## 2024-01-26 NOTE — PSYCH
Subjective:     Patient ID: Garcia Cuevas is a 43 y.o. male.    [facility] Clinical Progress Notes      Specialized Services Documentation  Therapist must complete separate progress note for each specific clinical activity in which the individual participated during the day.     Education Therapy     1108-1209 Garcia Cuevas engaged throughout the treatment day. Was engaged in learning related to Illness, Medication, Aftercare, and Wellness Tools. Staff utilized Verbal, Written, A/V, and Demonstration teaching methods.  Garcia Cuevas shared area of learning and set a goal for outside of program to continue working on the cycle of change.      Tx Plan Objective: 1.1,1.2, 1.4   Therapist: Kt Price MS    Other D/c clinicals completed with Eddie    Case Management Note    Kt Price MS    Current suicide risk : Low     Case Management Note    Laila Valenzuela MA, LPC    Current suicide risk : Low     ([id number]) CM reviewed Relapse Prevention Plan, discharge instructions, medication list and crisis information with Garcia Cuevas.  See discharge summary for treatment details. Aftercare providers to receive discharge summary.      Medications changes/added/denied? No    Treatment session number: 11    Individual Case Management Visit provided today? Yes     [facility] follow up physician's orders: Successful discharge today 1/26/24

## 2024-01-26 NOTE — PSYCH
[facility] Discharge Instructions:   Disposition: home  Address: [address] [place] [address] .   Diagnosis:Bipolar 1 disorder (HCC)     PTSD (post-traumatic stress disorder)     Marijuana use, continuous     Tobacco use disorder     Methamphetamine use disorder, severe, in early remission (HCC).   Allergies (Drug/Food): No Known Allergies  Activity:  You may return to work after 1/26/24  Diet:no recommendations  Smoking Cessation:The best thing you can do to improve your health is to stop using tobacco  Diagnostic/Laboratory Orders: none  Vaccines: If you received a vaccine, please notify your family physician on your next visit. For more information, please call [phone].     Follow-up appointments/Referrals:     Talk Therapy: Sheryl Leonard 2/15 @ 9a   [address] [place] [address]  [phone]    Medication Management: Adama Card 2/21 @ [address] [address]  [phone]    ICM/CTT:none  [facility] [phone].    Intake/Referral/Evaluation (Non-Emergency) *NON INSURED FOR FUNDING: [facility]: [phone], [facility]: [phone], [facility]: [phone] and [facility]: [phone]. Crisis Intervention (Emergency) [facility] Service: [facility]: [phone], [place]: [phone], [place]: [phone], [facility]: [phone], [place]: [phone] and [facility]: [phone]. _________________________________  National Crisis Intervention Hotline: 1-760.397.5450.  National Suicide Crisis Hotline: 1-976.538.5299.     I, the undersigned, have received and understand the above instructions.        Patient/Rep Signature: __________________________________       Date/Time: ______________         Relationship: __________________________________________       Date/Time: ______________         Physician Signature: ____________________________________      Date/Time: ______________                Signature: ________________________________       Date/Time: ______________

## 2024-01-26 NOTE — PSYCH
Subjective:    Patient ID: Garcia Cuevas is a 43 y.o. male      [facility] Clinical Progress Notes      Specialized Services Documentation  Therapist must complete separate progress note for each specific clinical activity in which the individual participated during the day.     Group Psychotherapy (0930-1030) The group engaged in the wellness assessment, which evaluates progress on several different areas of wellness/wellbeing: physical, emotional, cognitive, vocational, social, spiritual, and hygiene. Clients rated their progress and discussed areas that need work. By completing and discussing areas of progress and challenges, members are connected and reminded that, in their mental health struggle, they are not alone. Topics of discussion related to time management, gratitude, incorporating movement, and the importance of self-practice outside of program. Garcia Cuevas continues to demonstrate progress towards goals through participation in group activity and personal disclosures. Garcia shared he is looking forward to discharge and returning to work. He identified he has been consistently using the WAIT skill outside of program. Continue with planned discharge at the end of treatment day. Tx Plan Objective: 1.1, 1.2, 1.4, Therapist:  AAYUSH Carey     Education Therapy   [id number] Garcia Cuevas actively shared in check in and goal review. Presented as Receptive related to readiness to learn.  Garcia Cuevas  did complete goal from last treatment day identifying gaining responsibility and support. did not present with any barriers to learning.     Tx Plan Objective: 1.1, 1.2, 1.4, Therapist:  AAYUSH Carey

## 2024-01-26 NOTE — PSYCH
Group Psychotherapy    Subjective:     Patient ID: Garcia Cuevas 43 y.o.     This group was facilitated in a private office.  ([id number])Garcia Cuevas actively engaged in psychoeducational group about the Cycle of Change. The skill helps to mange the cycle of recovery and behavior change focused toward a specified goal. Group explored the cycle of change that can help them to take care of physical and emotional well being on a short term and long term basis. The group talked about understanding the meaning of relapse in regards to physical, intellectual, and emotional expression, regulation , and recognition, and how it affects themselves and others. Teaching on the emphasis of self monitoring and relapse,  the group explored who they can go to for help was brought up as well. Group was encouraged to ask questions in an open forum at the end of group. Positive progress displayed through engagement in topic.Garcia Cuevas will continue to engage in psychotherapy to encourage positive self realization.  Treatment Plan Objective 1.1, 1.2, 1.3, 1.4 Therapist: Demetrio CARDOZO Ed.

## 2024-02-05 ENCOUNTER — APPOINTMENT (OUTPATIENT)
Dept: LAB | Facility: CLINIC | Age: 44
End: 2024-02-05
Payer: COMMERCIAL

## 2024-02-05 DIAGNOSIS — Z51.81 ENCOUNTER FOR THERAPEUTIC DRUG MONITORING: ICD-10-CM

## 2024-02-05 DIAGNOSIS — F31.9 BIPOLAR 1 DISORDER (HCC): ICD-10-CM

## 2024-02-05 LAB
ALBUMIN SERPL BCP-MCNC: 4.2 G/DL (ref 3.5–5)
ALP SERPL-CCNC: 49 U/L (ref 34–104)
ALT SERPL W P-5'-P-CCNC: 11 U/L (ref 7–52)
ANION GAP SERPL CALCULATED.3IONS-SCNC: 7 MMOL/L
AST SERPL W P-5'-P-CCNC: 15 U/L (ref 13–39)
BASOPHILS # BLD AUTO: 0.06 THOUSANDS/ÂΜL (ref 0–0.1)
BASOPHILS NFR BLD AUTO: 1 % (ref 0–1)
BILIRUB SERPL-MCNC: 0.22 MG/DL (ref 0.2–1)
BUN SERPL-MCNC: 8 MG/DL (ref 5–25)
CALCIUM SERPL-MCNC: 8.5 MG/DL (ref 8.4–10.2)
CHLORIDE SERPL-SCNC: 105 MMOL/L (ref 96–108)
CO2 SERPL-SCNC: 32 MMOL/L (ref 21–32)
CREAT SERPL-MCNC: 1.22 MG/DL (ref 0.6–1.3)
EOSINOPHIL # BLD AUTO: 0.07 THOUSAND/ÂΜL (ref 0–0.61)
EOSINOPHIL NFR BLD AUTO: 1 % (ref 0–6)
ERYTHROCYTE [DISTWIDTH] IN BLOOD BY AUTOMATED COUNT: 12.6 % (ref 11.6–15.1)
GFR SERPL CREATININE-BSD FRML MDRD: 71 ML/MIN/1.73SQ M
GLUCOSE P FAST SERPL-MCNC: 80 MG/DL (ref 65–99)
HCT VFR BLD AUTO: 42 % (ref 36.5–49.3)
HGB BLD-MCNC: 13.9 G/DL (ref 12–17)
IMM GRANULOCYTES # BLD AUTO: 0.13 THOUSAND/UL (ref 0–0.2)
IMM GRANULOCYTES NFR BLD AUTO: 2 % (ref 0–2)
LITHIUM SERPL-SCNC: <0.1 MMOL/L (ref 0.6–1.2)
LYMPHOCYTES # BLD AUTO: 2.88 THOUSANDS/ÂΜL (ref 0.6–4.47)
LYMPHOCYTES NFR BLD AUTO: 37 % (ref 14–44)
MCH RBC QN AUTO: 30.3 PG (ref 26.8–34.3)
MCHC RBC AUTO-ENTMCNC: 33.1 G/DL (ref 31.4–37.4)
MCV RBC AUTO: 92 FL (ref 82–98)
MONOCYTES # BLD AUTO: 0.52 THOUSAND/ÂΜL (ref 0.17–1.22)
MONOCYTES NFR BLD AUTO: 7 % (ref 4–12)
NEUTROPHILS # BLD AUTO: 4.15 THOUSANDS/ÂΜL (ref 1.85–7.62)
NEUTS SEG NFR BLD AUTO: 52 % (ref 43–75)
NRBC BLD AUTO-RTO: 0 /100 WBCS
PLATELET # BLD AUTO: 316 THOUSANDS/UL (ref 149–390)
PMV BLD AUTO: 10.1 FL (ref 8.9–12.7)
POTASSIUM SERPL-SCNC: 4 MMOL/L (ref 3.5–5.3)
PROT SERPL-MCNC: 6.8 G/DL (ref 6.4–8.4)
RBC # BLD AUTO: 4.58 MILLION/UL (ref 3.88–5.62)
SODIUM SERPL-SCNC: 144 MMOL/L (ref 135–147)
WBC # BLD AUTO: 7.81 THOUSAND/UL (ref 4.31–10.16)

## 2024-02-05 PROCEDURE — 80178 ASSAY OF LITHIUM: CPT

## 2024-02-05 PROCEDURE — 85025 COMPLETE CBC W/AUTO DIFF WBC: CPT

## 2024-02-05 PROCEDURE — 36415 COLL VENOUS BLD VENIPUNCTURE: CPT

## 2024-02-05 PROCEDURE — 80053 COMPREHEN METABOLIC PANEL: CPT

## 2024-02-14 ENCOUNTER — SOCIAL WORK (OUTPATIENT)
Dept: BEHAVIORAL/MENTAL HEALTH CLINIC | Facility: CLINIC | Age: 44
End: 2024-02-14

## 2024-02-14 DIAGNOSIS — F31.9 BIPOLAR 1 DISORDER (HCC): ICD-10-CM

## 2024-02-14 DIAGNOSIS — F43.10 PTSD (POST-TRAUMATIC STRESS DISORDER): Primary | ICD-10-CM

## 2024-02-14 DIAGNOSIS — F12.90 MARIJUANA USE, CONTINUOUS: ICD-10-CM
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
## 2024-02-21 ENCOUNTER — OFFICE VISIT (OUTPATIENT)
Dept: PSYCHIATRY | Facility: CLINIC | Age: 44
End: 2024-02-21
Payer: COMMERCIAL

## 2024-02-21 DIAGNOSIS — F43.12 POST-TRAUMATIC STRESS DISORDER, CHRONIC: ICD-10-CM

## 2024-02-21 DIAGNOSIS — F31.9 BIPOLAR 1 DISORDER (HCC): Primary | ICD-10-CM

## 2024-02-21 DIAGNOSIS — Z79.899 HIGH RISK MEDICATION USE: ICD-10-CM

## 2024-02-21 PROCEDURE — 90792 PSYCH DIAG EVAL W/MED SRVCS: CPT | Performed by: PHYSICIAN ASSISTANT
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
## 2024-02-28 ENCOUNTER — SOCIAL WORK (OUTPATIENT)
Dept: BEHAVIORAL/MENTAL HEALTH CLINIC | Facility: CLINIC | Age: 44
End: 2024-02-28
Payer: COMMERCIAL

## 2024-02-28 DIAGNOSIS — F12.90 MARIJUANA USE, CONTINUOUS: ICD-10-CM

## 2024-02-28 DIAGNOSIS — F43.10 PTSD (POST-TRAUMATIC STRESS DISORDER): ICD-10-CM

## 2024-02-28 DIAGNOSIS — Z79.899 HIGH RISK MEDICATION USE: Primary | ICD-10-CM

## 2024-02-28 DIAGNOSIS — F31.9 BIPOLAR 1 DISORDER (HCC): Primary | ICD-10-CM

## 2024-02-28 PROCEDURE — 90834 PSYTX W PT 45 MINUTES: CPT
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
## 2024-02-28 NOTE — PROGRESS NOTES
Called patient and left voicemail that labs were ordered.  Noted that Depakote level-will request that patient hold his morning dose of Depakote until he gets his level and that he may take his dose afterwards.  Noted that his lipid panel and A1c are fasting labs.  Left callback number if any questions or concerns arise.

## 2024-02-28 NOTE — BH CRISIS PLAN
"Client Name: Garcia Cuevas       Client YOB: 1980    [employer] Safety Plan      Creation Date: 1/12/24 Update Date: 11/12/24   Created By: Demetrio Galindo       Step 1: Warning Signs:   Warning Signs   self hate   feel like I\"m failing   Frustration increases            Step 2: Internal Coping Strategies:   Internal Coping Strategies   sit downstairs   isolate to listen to music   meditate            Step 3: People and social settings that provide distraction:   Name Contact Information   Will phone number is in the \"other\" cell phone   Shani [phone]    [facility]   Just walk around           Step 4: People whom I can ask for help during a crisis:      Name Contact Information    [place] Help Line 211    [facility] Crisis Help Line Dial 504 then press [address] [place] [address] Crisis [phone]      Step 5: Professionals or agencies I can contact during a crisis:      Clinican/Agency Name Phone Emergency Contact    n/a at this time        Local Emergency Department Emergency Department Phone Emergency Department Address    [facility] [phone] [address] [place] [id number]        Crisis Phone Numbers:   Suicide Prevention Lifeline: Call or Text  271 Crisis Text Line: Text HOME to 561-994   Please note: Some [facility] do not have a separate number for Child/Adolescent specific crisis. If your county is not listed under Child/Adolescent, please call the adult number for your county      Adult Crisis Numbers: Child/Adolescent Crisis Numbers   [facility]: [phone] [facility]: [phone]   [facility]: [phone] [facility]: [phone]   [facility]: [phone] [place]: [phone]   [facility]: [phone] [place]: [phone]   [facility]: [phone]   [facility]: [phone]   [facility]: [phone]   [place] [facility]: [phone] (daytime) [phone] " (after hours, weekends, holidays)      Step 6: Making the environment safer (plan for lethal means safety):   Plan: NO lethal methods at this time     Optional: What is most important to me and worth living for?   Living for my son. Taking caring of my wife who is working on health issues.     Mine Safety Plan. Asha Saeed and Rehan Ignacio. Used with permission of the authors.

## 2024-02-28 NOTE — BH TREATMENT PLAN
"Outpatient Behavioral Health Psychotherapy Treatment Plan    Garcia RAMÍREZ Cuevas  1980     Date of Initial Psychotherapy Assessment:  2/14/24  Date of Current Treatment Plan: 02/28/24  Treatment Plan Target Date: 8/28/24  Treatment Plan Expiration Date: 8/28/24    Diagnosis:   No diagnosis found.    Area(s) of Need: Anger and addiction to marijuana    Long Term Goal 1 (in the client's own words): \"I want to better manage my anger and quit smoking marijuana\"    Stage of Change: Contemplation    Target Date for completion: 8/28/24     Anticipated therapeutic modalities: Motivational interviewing, CBT     People identified to complete this goal: Garcia, therapist      Objective 1: (identify the means of measuring success in meeting the objective): Garcia will utilize WAIT technique he learned at [facility], to decrease level of anger, on a daily basis.       Objective 2: (identify the means of measuring success in meeting the objective): Garcia will come to therapy sessions without being under the influence      LI am currently under the care of a [facility] psychiatric provider: yes    My [facility] psychiatric provider is: Ruby Card    I am currently taking psychiatric medications: Yes, as prescribed    I feel that I will be ready for discharge from mental health care when I reach the following (measurable goal/objective): Anger has decreased, marijuana used discontinued    For children and adults who have a legal guardian:   Has there been any change to custody orders and/or guardianship status? NA. If yes, attach updated documentation.    I have created my Crisis Plan and have been offered a copy of this plan    Behavioral Health Treatment Plan [facility]: Diagnosis and Treatment Plan explained to Garcia Cuevas acknowledges an understanding of their diagnosis. Garcia Cuevas agrees to this treatment plan.    I have been offered a copy of this Treatment Plan. yes        "

## 2024-02-29 ENCOUNTER — APPOINTMENT (OUTPATIENT)
Dept: LAB | Facility: CLINIC | Age: 44
End: 2024-02-29
Payer: COMMERCIAL

## 2024-02-29 DIAGNOSIS — Z79.899 HIGH RISK MEDICATION USE: ICD-10-CM

## 2024-02-29 LAB
CHOLEST SERPL-MCNC: 176 MG/DL
EST. AVERAGE GLUCOSE BLD GHB EST-MCNC: 120 MG/DL
HBA1C MFR BLD: 5.8 %
HDLC SERPL-MCNC: 53 MG/DL
LDLC SERPL CALC-MCNC: 98 MG/DL (ref 0–100)
NONHDLC SERPL-MCNC: 123 MG/DL
TRIGL SERPL-MCNC: 124 MG/DL
VALPROATE SERPL-MCNC: 87 UG/ML (ref 50–100)

## 2024-02-29 PROCEDURE — 83036 HEMOGLOBIN GLYCOSYLATED A1C: CPT

## 2024-02-29 PROCEDURE — 80061 LIPID PANEL: CPT

## 2024-02-29 PROCEDURE — 80164 ASSAY DIPROPYLACETIC ACD TOT: CPT

## 2024-02-29 PROCEDURE — 36415 COLL VENOUS BLD VENIPUNCTURE: CPT

## 2024-03-01 DIAGNOSIS — F31.9 BIPOLAR 1 DISORDER (HCC): ICD-10-CM

## 2024-03-01 RX ORDER — DIVALPROEX SODIUM 500 MG/1
1000 TABLET, EXTENDED RELEASE ORAL
Qty: 180 TABLET | Refills: 1 | Status: SHIPPED | OUTPATIENT
Start: 2024-03-01

## 2024-03-13 ENCOUNTER — SOCIAL WORK (OUTPATIENT)
Dept: BEHAVIORAL/MENTAL HEALTH CLINIC | Facility: CLINIC | Age: 44
End: 2024-03-13
Payer: COMMERCIAL

## 2024-03-13 DIAGNOSIS — F31.9 BIPOLAR 1 DISORDER (HCC): ICD-10-CM

## 2024-03-13 DIAGNOSIS — F43.10 PTSD (POST-TRAUMATIC STRESS DISORDER): Primary | ICD-10-CM

## 2024-03-13 PROBLEM — F12.90 MARIJUANA USE, CONTINUOUS: Chronic | Status: ACTIVE | Noted: 2024-01-24

## 2024-03-13 PROCEDURE — 90834 PSYTX W PT 45 MINUTES: CPT

## 2024-03-13 NOTE — PSYCH
"Behavioral Health Psychotherapy Progress Note    Psychotherapy Provided: Individual Psychotherapy     1. PTSD (post-traumatic stress disorder)        2. Bipolar 1 disorder (HCC)            Goals addressed in session: Goal 1     DATA: Garcia arrived to session, writer asked how Garcia has been since last session. Curly reports he is tired but \"sober\". Writer unsure as to whether or not this is true but provided positive feedback to curly for working on his sobriety. Curly also said he has gone from a case of beer to only a 6 pack which he spreads out during the week. Writer pointed out that this appears to be a drastic change and inquired about how curly feels. Clt said he feels better being sober. We spoke about honesty with self, motivation to change, how curly feels in silence (after he said he does not know what to talk about, but said he is comfortable in silence), feelings of social anxiety after [employer], memories of younger years (positive) compared to his lifestyle now/current age and how relationships developed and changed.   Curly expressed some frustration with communication with his wife, Shani, but things are okay overall. Curly elaborted on the dynamics in the household, explained how he utilizes W.A.I.T. and other tools that he learned in [employer], to remain calm. Clt added he got angry at a work situation but used this to avoid gettign argumentative.   Curly expressed feeling releived after session, in that he is able to communicate his feelings with therapist rather than his wife, a more neutral way of engaging.     During this session, this clinician used the following therapeutic modalities: Engagement Strategies, Client-centered Therapy, Dialectical Behavior Therapy, Motivational Interviewing, and Supportive Psychotherapy    Substance Abuse was addressed during this session. If the client is diagnosed with a co-occurring substance use disorder, please indicate any changes in the frequency or amount of use: " "client denies any use as of today  . Stage of change for addressing substance use diagnoses: Preparation    ASSESSMENT:  Garcia Cuevas presents with a Euthymic/ normal and Anxious mood.     his affect is Normal range and intensity and Constricted, which is congruent, with his mood and the content of the session. The client has made progress on their goals.       Garcia Cuevas presents with a none risk of suicide, none risk of self-harm, and none risk of harm to others.    For any risk assessment that surpasses a \"low\" rating, a safety plan must be developed.    A safety plan was indicated: no  If yes, describe in detail       PLAN: Between sessions, Garcia Cuevas will continue to work on staying sober, use tools to manage anger. At the next session, the therapist will use Engagement Strategies, Client-centered Therapy, Motivational Interviewing, and Supportive Psychotherapy to address mood, anxiety, sobriety, anger.    Behavioral Health Treatment Plan and Discharge Planning: Garcia Cuevas is aware of and agrees to continue to work on their treatment plan. They have identified and are working toward their discharge goals. yes    Visit start and stop times:    03/13/24  Start Time: 0200  Stop Time: 0245  Total Visit Time: 45 minutes  "

## 2024-03-14 DIAGNOSIS — Z79.899 HIGH RISK MEDICATION USE: Primary | ICD-10-CM

## 2024-03-20 ENCOUNTER — OFFICE VISIT (OUTPATIENT)
Dept: PSYCHIATRY | Facility: CLINIC | Age: 44
End: 2024-03-20
Payer: COMMERCIAL

## 2024-03-20 DIAGNOSIS — F31.9 BIPOLAR 1 DISORDER (HCC): Primary | ICD-10-CM

## 2024-03-20 DIAGNOSIS — G47.09 OTHER INSOMNIA: ICD-10-CM

## 2024-03-20 DIAGNOSIS — F43.10 PTSD (POST-TRAUMATIC STRESS DISORDER): ICD-10-CM

## 2024-03-20 PROCEDURE — 99214 OFFICE O/P EST MOD 30 MIN: CPT | Performed by: PHYSICIAN ASSISTANT

## 2024-03-20 RX ORDER — RISPERIDONE 2 MG/1
2 TABLET ORAL 2 TIMES DAILY
Qty: 60 TABLET | Refills: 1 | Status: SHIPPED | OUTPATIENT
Start: 2024-03-20

## 2024-03-21 NOTE — PSYCH
"  This note was not shared with the patient due to reasonable likelihood of causing patient harm   PROGRESS NOTE        [facility]      Name and Date of Birth:  Garcia Cuevas 43 y.o. 1980    Date of Visit: 03/20/24    SUBJECTIVE:    Garcia presents today for medication management and follow up.  He shares that he is very excited today and smiles appropriately.  He reports that over the last couple of days he was informed that he did get a promotion at work.  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  He shares that he is getting a pay raise which will be helpful.  He states \"I was having a bad day and then everything just fell into place.\"    He does share that he was very upset by the behavior of his step daughters boyfriend.  He states \"there are certain things that you should not do in front of your children and as a dad and he broke all of the rules which may be very upset.\"  He goes on to say \" in the past things may have gotten out of hand due to my anger however I was able to reveal it in and have him leave the house.\"  He reports that the police were called.  Currently he will not be allowed back onto Garcia's property.    He shares that he has been compliant with his prescribed medications.  He did obtain his labs that were ordered.  He denies any negative side effects.    He denies any significant anxiety or depressive symptoms.  He denies any symptoms of jamarcus.  He denies any major changes with sleep or appetite.  He shares that his sleep schedule is off because he does have to wake up in the middle of the night to drive his wife to work.  He reports that he is able to get back to sleep even though his sleep schedule is disrupted.      He denies suicidal ideation, intent or plan at present, has no suicidal ideation, intent or plan at present.    He denies any " auditory hallucinations and visual hallucinations, denies any other delusional thinking, denies any delusional thinking.    He denies any side effects from medications  .  HPI ROS Appetite Changes and Sleep: normal appetite, normal sleep    Review Of Systems:      Constitutional Negative   ENT Negative   Cardiovascular Negative   Respiratory Negative   Gastrointestinal Negative   Genitourinary Negative   Musculoskeletal Negative   Integumentary Negative   Neurological Negative   Endocrine Negative   Other Symptoms Negative and None       Laboratory Results: No results found for this or any previous visit.    Substance Abuse History:    Social History     Substance and Sexual Activity   Drug Use Yes    Types: Marijuana, MDMA (ecstacy)    Comment: not on medical marijuana; obtains from dispensary. MDMA in the past last used summer 2023, first used in high school, restarted weekly since 2002 when dishonorably discharged from        Family Psychiatric History:     Family History   Problem Relation Age of Onset    Heart disease Mother     Osteoporosis Mother     Drug abuse Father     Alcohol abuse Father     HIV Sister     No Known Problems Brother     Drug abuse Maternal Uncle     Drug abuse Paternal Uncle        The following portions of the patient's history were reviewed and updated as appropriate: past family history, past medical history, past social history, past surgical history and problem list.    Social History     Socioeconomic History    Marital status: /Civil Union     Spouse name: Not on file    Number of children: 3    Years of education: some college    Highest education level: Some college, no degree   Occupational History    Occupation:  - [employer]   Tobacco Use    Smoking status: Every Day     Current packs/day: 0.50     Average packs/day: 1.5 packs/day for 9.9 years (14.6 ttl pk-yrs)     Types: Cigarettes     Start date: 2014     Last attempt to quit: 8/25/2023     Smokeless tobacco: Never    Tobacco comments:     trying to quit under 6 cigrates a day   Vaping Use    Vaping status: Never Used   Substance and Sexual Activity    Alcohol use: Yes     Alcohol/week: 10.0 standard drinks of alcohol     Types: 10 Cans of beer per week    Drug use: Yes     Types: Marijuana, MDMA (ecstacy)     Comment: not on medical marijuana; obtains from dispensary. MDMA in the past last used summer 2023, first used in high school, restarted weekly since 2002 when dishonorably discharged from     Sexual activity: Not Currently     Partners: Female   Other Topics Concern    Not on file   Social History Narrative    Not on file     Social Determinants of Health     Financial Resource Strain: Not on file   Food Insecurity: Not on file   Transportation Needs: Not on file   Physical Activity: Not on file   Stress: Not on file   Social Connections: Not on file   Intimate Partner Violence: Low Risk  (2/20/2022)    Received from [facility]    Intimate Partner Violence     Insults You: Not on file     Threatens You: Not on file     Screams at You: Not on file     Physically Hurt: Not on file     Intimate Partner Violence Score: Not on file   Housing Stability: Not on file     Social History     Social History Narrative    Not on file        Social History       Tobacco History       Smoking Status  Every Day Smoking Start Date  2014 Last Attempt to Quit  8/25/2023 Current Packs/Day  0.5 packs/day Average Packs/Day  1.5 packs/day for 9.9 years (14.6 ttl pk-yrs)    Smoking Tobacco Type  Cigarettes started 2014 and last attempt to quit 8/25/2023   Pack Year History     Packs/Day From To Years    0.5 12/20/2023  0.2    0 8/25/2023 12/20/2023 0.3    1.5 2014 8/25/2023 9.6      Smokeless Tobacco Use  Never      Tobacco Comments  trying to quit under 6 cigrates a day              Alcohol History       Alcohol Use Status  Yes Drinks/Week  10 Cans of beer per week Amount  10.0 standard drinks of  alcohol/wk              Drug Use       Drug Use Status  Yes Types  MDMA (ecstacy), Marijuana Comment  not on medical marijuana; obtains from dispensary. MDMA in the past last used summer 2023, first used in high school, restarted weekly since 2002 when dishonorably discharged from               Sexual Activity       Sexually Active  Not Currently Partners  Female              Activities of Daily Living    Not Asked                       OBJECTIVE:     Mental Status Evaluation:    Appearance age appropriate, casually dressed   Behavior pleasant, cooperative   Speech normal volume, normal pitch   Mood euthymic   Affect Mood congruent    Thought Processes logical   Associations intact associations   Thought Content normal   Perceptual Disturbances: none   Abnormal Thoughts  Risk Potential Suicidal ideation - None  Homicidal ideation - None  Potential for aggression - No   Orientation oriented to person, place, time/date and situation   Memory recent and remote memory grossly intact   Cosciousness alert and awake   Attention Span attention span and concentration are age appropriate   Intellect Appears to be of Average Intelligence   Insight age appropriate    Judgement good    Muscle Strength and  Gait muscle strength and tone were normal   Language no difficulty naming common objects   Fund of Knowledge displays adequate knowledge of current events   Pain none   Pain Scale 0       Assessment/Plan:       Diagnoses and all orders for this visit:    Bipolar 1 disorder (HCC)  -     risperiDONE (RisperDAL) 2 mg tablet; Take 1 tablet (2 mg total) by mouth 2 (two) times a day    PTSD (post-traumatic stress disorder)    Other insomnia          Treatment Recommendations/Precautions:       Patient had labs drawn 2/29/24: Valproic Acid Level 87, A1C 5.8 (Patient referred to PCP), lipid panel WNL    We discussed the importance of healthy nutrition and physical exercise.     Continue current medications:     Depakote   mg QAM, 1000 mg QHS for mood   Risperdal 2 mg BID for mood      We will follow up in two months or sooner if questions or concerns arise.   He is aware of emergent vs non-emergent mental health resources  He is able to contract for his own safety at this time   He will continue with his internal psychotherapist     Risks/Benefits      Risks, Benefits And Possible Side Effects Of Medications:    Risks, benefits, and possible side effects of medications explained to patient and patient verbalizes understanding and agreement for treatment.    Controlled Medication Discussion:     Not applicable    Psychotherapy Provided:     Individual psychotherapy provided: No    Visit Start Time:  930 AM  Visit End Time:  955 AM  Total Visit Duration: 25 minutes    This note was completed in part utilizing Dragon dictation Software. Grammatical, translation, syntax errors, random word insertions, spelling mistakes, and incomplete sentences may be an occasional consequence of this system secondary to software limitations with voice recognition, ambient noise, and hardware issues. If you have any questions or concerns about the content, text, or information contained within the body of this dictation, please contact the provider for clarification.

## 2024-03-27 ENCOUNTER — SOCIAL WORK (OUTPATIENT)
Dept: BEHAVIORAL/MENTAL HEALTH CLINIC | Facility: CLINIC | Age: 44
End: 2024-03-27
Payer: COMMERCIAL

## 2024-03-27 DIAGNOSIS — F41.9 ANXIETY: ICD-10-CM

## 2024-03-27 DIAGNOSIS — F12.90 MARIJUANA USE, CONTINUOUS: Primary | Chronic | ICD-10-CM

## 2024-03-27 DIAGNOSIS — F31.9 BIPOLAR 1 DISORDER (HCC): ICD-10-CM

## 2024-03-27 DIAGNOSIS — F43.12 POST-TRAUMATIC STRESS DISORDER, CHRONIC: ICD-10-CM

## 2024-03-27 PROCEDURE — 90834 PSYTX W PT 45 MINUTES: CPT

## 2024-03-27 NOTE — PSYCH
"Behavioral Health Psychotherapy Progress Note    Psychotherapy Provided: Individual Psychotherapy     1. Marijuana use, continuous        2. Anxiety        3. Bipolar 1 disorder (HCC)        4. Post-traumatic stress disorder, chronic            Goals addressed in session: Goal 1     DATA: Garcia reports that things have been going a little better with his wife's dtr , who is starting to pay her bills. We spoke about angel's family and relationships with his brother and one son. Clt and writer spoke about reported methamphetamine use, as it shows up in clt's blood work that he used meth, but reports he \"drank Red Bull every day\". Clt arrived to session and reported he is sober and did not smoke today; last \"blunt\" was last week. Clt reports his wife reminds him he cannot smoke before sessions. Clt spoke more about what he mentioned when he was leaving session 2 weeks ago, which was about a group he goes to to address his depression. Clt said he goes to [facility] for this group and finds it helpful. He struggles with anxiety when he is not smoking marijuana. However, he states he utilizes relaxation exercises, as he mentioned during initial visit.   Clt spoke about his history of trauma, being in the , talking about it in IOP which he states was \"a release\" but then he would again \"block\" the memories.   During this session, this clinician used the following therapeutic modalities: Engagement Strategies, Client-centered Therapy, and Motivational Interviewing    Substance Abuse was addressed during this session. If the client is diagnosed with a co-occurring substance use disorder, please indicate any changes in the frequency or amount of use: decrease in drinking, smoking pot . Stage of change for addressing substance use diagnoses: Preparation    ASSESSMENT:  Garcia Cuevas presents with a Euthymic/ normal mood.     his affect is Normal range and intensity, which is congruent, with his mood and the content of the " "session. The client has made progress on their goals.       Garcia Cuevas presents with a none risk of suicide, none risk of self-harm, and none risk of harm to others.    For any risk assessment that surpasses a \"low\" rating, a safety plan must be developed.    A safety plan was indicated: no  If yes, describe in detail       PLAN: Between sessions, Garcia Cuevas will cotinue to work towards refrain from smoking pot and drinking, work on managing anger At the next session, the therapist will use Engagement Strategies, Client-centered Therapy, Cognitive Behavioral Therapy, Motivational Interviewing, and Supportive Psychotherapy to address anger, feelings of low self esteem.    Behavioral Health Treatment Plan and Discharge Planning: Garcia Cuevas is aware of and agrees to continue to work on their treatment plan. They have identified and are working toward their discharge goals. yes    Visit start and stop times:    03/28/24  Start Time: 0200  Stop Time: 0245  Total Visit Time: 45 minutes  "

## 2024-04-03 ENCOUNTER — TELEPHONE (OUTPATIENT)
Dept: PSYCHIATRY | Facility: CLINIC | Age: 44
End: 2024-04-03

## 2024-04-10 ENCOUNTER — TELEPHONE (OUTPATIENT)
Dept: PSYCHIATRY | Facility: CLINIC | Age: 44
End: 2024-04-10

## 2024-04-10 ENCOUNTER — SOCIAL WORK (OUTPATIENT)
Dept: BEHAVIORAL/MENTAL HEALTH CLINIC | Facility: CLINIC | Age: 44
End: 2024-04-10

## 2024-04-10 DIAGNOSIS — F12.90 MARIJUANA USE, CONTINUOUS: Chronic | ICD-10-CM

## 2024-04-10 DIAGNOSIS — F41.9 ANXIETY: Primary | ICD-10-CM

## 2024-04-10 DIAGNOSIS — F31.9 BIPOLAR 1 DISORDER (HCC): ICD-10-CM

## 2024-04-10 DIAGNOSIS — F43.10 PTSD (POST-TRAUMATIC STRESS DISORDER): ICD-10-CM

## 2024-04-10 NOTE — TELEPHONE ENCOUNTER
Yes.  Cancelled the 4/24, 5/8 and 5/22 appointments.  I was able to get him in for 5/15 at 4 pm due to open appt.  I will place on wait list for the 6 weeks until the appt in June.

## 2024-04-10 NOTE — PSYCH
"Behavioral Health Psychotherapy Progress Note    Psychotherapy Provided: Individual Psychotherapy     1. Anxiety        2. Bipolar 1 disorder (HCC)        3. Marijuana use, continuous        4. PTSD (post-traumatic stress disorder)            Goals addressed in session: Goal 1     DATA: Garcia showed up stating he was cranky and tired, as he has not slept much due to work schedule. He also reports his allergies are bothering him. Clt was reserved and not as engaged as last few sessions, but appropriate. Writer followed up on last session and inquired if clt had anything he'd like to discuss. Clt stated he was not feeling well and was so tired he \"could just go to sleep\". Writer explained clt is not forced to stay but offered doing a music therapy exercise, which we did. Following the song related to drinking and \"hitting the bottom\", clt expressed thoughts and emotions related to this, reflected on history of drinking and how he felt after he quit drinking.   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 Writer informed clt that at this time writer does not have availability but can contact him when writer has cancellations, and clt may also call as needed for appts. Clt said this would be \"great\".Session ended earlier than expected due to clt not feeling well, and stating he is \"fine with a short session\".     During this session, this clinician used the following therapeutic modalities: Engagement Strategies, Client-centered Therapy, Gestalt Therapy Techniques, and Music Therapy Techniques    Substance Abuse was addressed during this session. If the client is diagnosed with a co-occurring substance use disorder, please indicate any changes in the frequency or amount of use: denies recent use. Stage of change for addressing substance use diagnoses: " "Preparation    ASSESSMENT:  Garcia Cuevas presents with a Dysthymic mood.     his affect is Blunted, which is congruent, with his mood and the content of the session. The client has made progress on their goals.       Garcia Cuevas presents with a none risk of suicide, none risk of self-harm, and none risk of harm to others.    For any risk assessment that surpasses a \"low\" rating, a safety plan must be developed.    A safety plan was indicated: no  If yes, describe in detail       PLAN: Between sessions, Garcia Cuevas will contact office if he wishes to have a session. At the next session, the therapist will use Engagement Strategies and Client-centered Therapy to address any topics of concern.    Behavioral Health Treatment Plan and Discharge Planning: Garcia Cuevas is aware of and agrees to continue to work on their treatment plan. They have identified and are working toward their discharge goals. yes    Visit start and stop times:    04/10/24  Start Time: 0200  Stop Time: 0217  Total Visit Time: 17 minutes  "

## 2024-04-10 NOTE — TELEPHONE ENCOUNTER
----- Message from OPHEILA Malave sent at 4/10/2024  2:36 PM EDT -----  Hi - I'm guessing clt told you, as he said he would when he checked out, but he would like to be placed on cancellation list, as he has a new work schedule and I don't have availability at this time after 3pm.   Can you please add him to the cancellations list?     GILDA Saucedo

## 2024-04-30 DIAGNOSIS — F31.9 BIPOLAR 1 DISORDER (HCC): ICD-10-CM

## 2024-04-30 RX ORDER — RISPERIDONE 2 MG/1
2 TABLET ORAL 2 TIMES DAILY
Qty: 180 TABLET | Refills: 1 | Status: SHIPPED | OUTPATIENT
Start: 2024-04-30

## 2024-05-15 ENCOUNTER — SOCIAL WORK (OUTPATIENT)
Dept: BEHAVIORAL/MENTAL HEALTH CLINIC | Facility: CLINIC | Age: 44
End: 2024-05-15
Payer: COMMERCIAL

## 2024-05-15 DIAGNOSIS — F31.10 BIPOLAR AFFECTIVE DISORDER, CURRENT EPISODE MANIC, CURRENT EPISODE SEVERITY UNSPECIFIED (HCC): ICD-10-CM

## 2024-05-15 DIAGNOSIS — F12.90 MARIJUANA USE, CONTINUOUS: Chronic | ICD-10-CM

## 2024-05-15 DIAGNOSIS — F43.10 PTSD (POST-TRAUMATIC STRESS DISORDER): Primary | ICD-10-CM

## 2024-05-15 PROCEDURE — 90832 PSYTX W PT 30 MINUTES: CPT

## 2024-05-15 NOTE — PSYCH
"Behavioral Health Psychotherapy Progress Note    Psychotherapy Provided: Individual Psychotherapy     1. PTSD (post-traumatic stress disorder)        2. Bipolar affective disorder, current episode manic, current episode severity unspecified (HCC)        3. Marijuana use, continuous            Goals addressed in session: Goal 1     DATA: Ishan returned after 4 weeks, as he was training at work. We reviewed what has transpired since he was here last. Garcia reports all things are going great, did not voice any concerns. Writer reviewed treatment plan goals with Garcia. Garcia denies smoking marijuana, reports he is sober; however, this writer questions whether or not clt was under the influence during this and prior sessions, based on affect and eyes. Writer spoke with clt about how he feels when he is sober. Clt reports he gets irritable sometimes, such as, with his family. Writer inquired about how things have been with family. Clt said his wife had a very Happy Mother's Day. Writer asked clt what he feels like discussing this session. Clt stated, \"Im an open book!\" Writer explained that this is his session, for him to talk about what he wishes, asked clt how he has been doing in managing his anger. Clt reports continuing to use WAIT acronym as he learend in IOP, to avoid yelling and speaking without thinking. Provided supporitive feedback to clt. Clt said he can also \"vent\". Writer encouraged clt to do so. Clt had nothing to vent about, he replied. Writer confirmed with clt, he has almost fully completed his goal and objectives, to manage his anger and quit smoking marijuana.    Writer asked clt what he feels like doing at this moment. Clt said he would like to go home and sleep, as he has to get ready for his night shift, at 8:30pm. Writer informed clt he does nto have to stay here. Clt asked if it was necessary to come to therapy in order to continue to receive medication for his bipolar dx. Writer informed clt he does " "nto have to attend therapy to get his medication, and can see just his PA-C, Ruby, for medication management. Clt chose to end session early. Writer encouraged clt to call when and if he would like a therapy session and informed clt his scheduled sessions will be removed. Clt thanked writer for time. Note to registration regarding discontinuing sessions and adding clt to cancellation list.     During this session, this clinician used the following therapeutic modalities: Engagement Strategies    Substance Abuse was addressed during this session. If the client is diagnosed with a co-occurring substance use disorder, please indicate any changes in the frequency or amount of use: weekly smoking marijuana. Stage of change for addressing substance use diagnoses: Contemplation    ASSESSMENT:  Garcia Cuevas presents with a Euthymic/ normal mood.     his affect is Normal range and intensity, which is congruent, with his mood and the content of the session. The client has made progress on their goals.       aGrcia Cuevas presents with a none risk of suicide, none risk of self-harm, and none risk of harm to others.    For any risk assessment that surpasses a \"low\" rating, a safety plan must be developed.    A safety plan was indicated: no  If yes, describe in detail       PLAN: Between sessions, Garcia Cuevas will call clinic if needed for appts. At the next session, the therapist will use Engagement Strategies to address new tx plan goals.    Behavioral Health Treatment Plan and Discharge Planning: Garcia Cuevas is aware of and agrees to continue to work on their treatment plan. They have identified and are working toward their discharge goals. yes    Visit start and stop times:    05/15/24  Start Time: 0258  Stop Time: 0316  Total Visit Time: 18 minutes  "

## 2024-06-02 ENCOUNTER — HOSPITAL ENCOUNTER (EMERGENCY)
Facility: HOSPITAL | Age: 44
Discharge: HOME | End: 2024-06-02
Attending: EMERGENCY MEDICINE
Payer: COMMERCIAL

## 2024-06-02 ENCOUNTER — APPOINTMENT (EMERGENCY)
Dept: RADIOLOGY | Facility: HOSPITAL | Age: 44
End: 2024-06-02
Payer: COMMERCIAL

## 2024-06-02 VITALS
DIASTOLIC BLOOD PRESSURE: 90 MMHG | RESPIRATION RATE: 20 BRPM | SYSTOLIC BLOOD PRESSURE: 136 MMHG | WEIGHT: 222 LBS | HEART RATE: 70 BPM | BODY MASS INDEX: 35.83 KG/M2 | TEMPERATURE: 97.8 F | OXYGEN SATURATION: 99 %

## 2024-06-02 DIAGNOSIS — R07.81 RIB PAIN ON RIGHT SIDE: Primary | ICD-10-CM

## 2024-06-02 PROCEDURE — 71101 X-RAY EXAM UNILAT RIBS/CHEST: CPT

## 2024-06-02 PROCEDURE — 99284 EMERGENCY DEPT VISIT MOD MDM: CPT

## 2024-06-02 PROCEDURE — 96372 THER/PROPH/DIAG INJ SC/IM: CPT

## 2024-06-02 PROCEDURE — 99284 EMERGENCY DEPT VISIT MOD MDM: CPT | Performed by: PHYSICIAN ASSISTANT

## 2024-06-02 RX ORDER — LIDOCAINE 50 MG/G
1 PATCH TOPICAL ONCE
Status: DISCONTINUED | OUTPATIENT
Start: 2024-06-02 | End: 2024-06-02 | Stop reason: HOSPADM

## 2024-06-02 RX ORDER — KETOROLAC TROMETHAMINE 30 MG/ML
15 INJECTION, SOLUTION INTRAMUSCULAR; INTRAVENOUS ONCE
Status: COMPLETED | OUTPATIENT
Start: 2024-06-02 | End: 2024-06-02

## 2024-06-02 RX ORDER — NAPROXEN 500 MG/1
500 TABLET ORAL 2 TIMES DAILY WITH MEALS
Qty: 14 TABLET | Refills: 0 | Status: SHIPPED | OUTPATIENT
Start: 2024-06-02 | End: 2024-06-09

## 2024-06-02 RX ADMIN — LIDOCAINE 5% 1 PATCH: 700 PATCH TOPICAL at 08:59

## 2024-06-02 RX ADMIN — KETOROLAC TROMETHAMINE 15 MG: 30 INJECTION, SOLUTION INTRAMUSCULAR; INTRAVENOUS at 08:59

## 2024-06-02 NOTE — ED PROVIDER NOTES
History  Chief Complaint   Patient presents with    Chest Pain     Patient had a strong coughing spell this morning, heard a pop, right rib cage pain since that. Pain with inspiration     43-year-old male presenting today with right lower rib pain that began suddenly after he had a coughing fit this morning.  Patient relays that he has not been recently sick however woke up and had some coughing and then felt a pop and a pull to the right lower rib region.  Pain worsens with deep inhalation and bending.  Has not had any shortness of breath however once again does worsen with deep inhalation.  Has no prior history of pneumothoraces.  Denies shortness of breath, chest pain, abdominal pain, flank pain, fevers, nausea, vomiting etc.  Differential includes but is not limited to pneumothorax, rib fracture, muscle strain or spasm etc.        Prior to Admission Medications   Prescriptions Last Dose Informant Patient Reported? Taking?   VITAMIN D PO   Yes No   Sig: Take 50,000 Units by mouth once a week   divalproex sodium (DEPAKOTE ER) 500 mg 24 hr tablet   No No   Sig: TAKE 2 TABLETS BY MOUTH DAILY AT BEDTIME   divalproex sodium (Depakote ER) 500 mg 24 hr tablet   No No   Sig: Take 1 tablet (500 mg total) by mouth daily Take one tablet in AM in addition to scheduled HS dose of 1000 mg   risperiDONE (RisperDAL) 2 mg tablet   No No   Sig: TAKE 1 TABLET BY MOUTH TWICE A DAY      Facility-Administered Medications: None       Past Medical History:   Diagnosis Date    Addiction to drug ([facility])     ADHD (attention deficit hyperactivity disorder)     Alcohol abuse     Anxiety     Bipolar disorder (HCC)     Depression     Obsessive-compulsive disorder     Panic attack     PTSD (post-traumatic stress disorder)     Self-injurious behavior     Substance abuse ([facility])        Past Surgical History:   Procedure Laterality Date    HAND SURGERY  1997    second metacarparl in left hand    WISDOM TOOTH EXTRACTION  1999       Family History    Problem Relation Age of Onset    Heart disease Mother     Osteoporosis Mother     Drug abuse Father     Alcohol abuse Father     HIV Sister     No Known Problems Brother     Drug abuse Maternal Uncle     Drug abuse Paternal Uncle      I have reviewed and agree with the history as documented.    E-Cigarette/Vaping    E-Cigarette Use Never User      E-Cigarette/Vaping Substances    Nicotine No     THC Yes      Social History     Tobacco Use    Smoking status: Every Day     Current packs/day: 0.50     Average packs/day: 1.5 packs/day for 10.1 years (14.7 ttl pk-yrs)     Types: Cigarettes     Start date: 2014     Last attempt to quit: 8/25/2023    Smokeless tobacco: Never    Tobacco comments:     trying to quit under 6 cigrates a day   Vaping Use    Vaping status: Never Used   Substance Use Topics    Alcohol use: Yes     Alcohol/week: 10.0 standard drinks of alcohol     Types: 10 Cans of beer per week    Drug use: Yes     Types: Marijuana, MDMA (ecstacy)     Comment: not on medical marijuana; obtains from dispensary. MDMA in the past last used summer 2023, first used in high school, restarted weekly since 2002 when dishonorably discharged from        Review of Systems   Constitutional: Negative.  Negative for chills, fatigue and fever.   HENT: Negative.  Negative for congestion, postnasal drip, rhinorrhea and sore throat.    Eyes: Negative.    Respiratory: Negative.  Negative for cough, shortness of breath and wheezing.    Cardiovascular: Negative.         Rib pain   Gastrointestinal: Negative.  Negative for abdominal pain, diarrhea, nausea and vomiting.   Endocrine: Negative.    Genitourinary: Negative.    Musculoskeletal: Negative.    Skin: Negative.    Neurological: Negative.    Hematological: Negative.    Psychiatric/Behavioral: Negative.     All other systems reviewed and are negative.      Physical Exam  Physical Exam  Vitals and nursing note reviewed.   Constitutional:       General: He is not in acute  distress.     Appearance: Normal appearance. He is well-developed and normal weight. He is not diaphoretic.   HENT:      Head: Normocephalic and atraumatic.      Right Ear: External ear normal.      Left Ear: External ear normal.      Nose: Nose normal.      Mouth/Throat:      Pharynx: No oropharyngeal exudate.   Eyes:      General: No scleral icterus.        Right eye: No discharge.         Left eye: No discharge.      Conjunctiva/sclera: Conjunctivae normal.      Pupils: Pupils are equal, round, and reactive to light.   Cardiovascular:      Rate and Rhythm: Normal rate and regular rhythm.      Pulses: Normal pulses.      Heart sounds: Normal heart sounds. No murmur heard.     No friction rub. No gallop.   Pulmonary:      Effort: Pulmonary effort is normal. No respiratory distress.      Breath sounds: Normal breath sounds. No stridor. No wheezing, rhonchi or rales.   Chest:      Chest wall: No tenderness.   Abdominal:      General: Abdomen is flat. Bowel sounds are normal. There is no distension.      Palpations: Abdomen is soft. There is no mass.      Tenderness: There is no abdominal tenderness. There is no guarding or rebound.      Hernia: No hernia is present.   Musculoskeletal:        Arms:       Cervical back: Normal range of motion and neck supple.   Lymphadenopathy:      Cervical: No cervical adenopathy.   Skin:     General: Skin is warm and dry.      Capillary Refill: Capillary refill takes less than 2 seconds.      Coloration: Skin is not pale.      Findings: No erythema or rash.   Neurological:      General: No focal deficit present.      Mental Status: He is alert and oriented to person, place, and time. Mental status is at baseline.   Psychiatric:         Thought Content: Thought content normal.         Judgment: Judgment normal.         Vital Signs  ED Triage Vitals [06/02/24 0809]   Temperature Pulse Respirations Blood Pressure SpO2   97.8 °F (36.6 °C) 71 20 136/90 98 %      Temp Source Heart Rate  Source Patient Position - Orthostatic VS BP Location FiO2 (%)   Oral Monitor Sitting Right arm --      Pain Score       --           Vitals:    06/02/24 0809 06/02/24 0830   BP: 136/90    Pulse: 71 70   Patient Position - Orthostatic VS: Sitting          Visual Acuity      ED Medications  Medications   lidocaine (LIDODERM) 5 % patch 1 patch (has no administration in time range)   ketorolac (TORADOL) injection 15 mg (has no administration in time range)       Diagnostic Studies  Results Reviewed       None                   XR ribs with pa chest min 3 views RIGHT   ED Interpretation by Becki Lomeli PA-C (06/02 0842)   No obvious signs of pneumothorax, mediastinum or acute osseous abnormality.                 Procedures  Procedures         ED Course                                             Medical Decision Making  No obvious signs of pneumothorax, mediastinum, rib fracture etc.  Will treat for muscle strain, spasm, rib contusion etc.  Will start on anti-inflammatories and topicals and patient was taught how to use incentive spirometry.    Patient is informed to return to the emergency department for worsening of symptoms such as fevers, difficulty breathing etc. and was given proper education regarding their diagnosis and symptoms. Otherwise the patient is informed to follow up with their primary care doctor for re-evaluation. The patient verbalizes understanding and agrees with above assessment and plan. All questions were answered.        Amount and/or Complexity of Data Reviewed  Radiology: ordered and independent interpretation performed.    Risk  Prescription drug management.             Disposition  Final diagnoses:   Rib pain on right side     Time reflects when diagnosis was documented in both MDM as applicable and the Disposition within this note       Time User Action Codes Description Comment    6/2/2024  8:50 AM Becki Lomeli Add [R07.81] Rib pain on right side           ED Disposition        ED Disposition   Discharge    Condition   Stable    Date/Time   Sun Jun 2, 2024  8:49 AM    Comment   Garcia Cuevas discharge to home/self care.                   Follow-up Information       Follow up With Specialties Details Why Contact Info Additional Information    [facility] Emergency Medicine Go to  If symptoms worsen such as difficulty breathing, high fevers, etc. [address] [facility] [id number]  [phone] [facility], [address] [place] [address], [id number]            Patient's Medications   Discharge Prescriptions    NAPROXEN (NAPROSYN) 500 MG TABLET    Take 1 tablet (500 mg total) by mouth 2 (two) times a day with meals for 7 days       Start Date: 6/2/2024  End Date: 6/9/2024       Order Dose: 500 mg       Quantity: 14 tablet    Refills: 0       No discharge procedures on file.    PDMP Review         Value Time User    PDMP Reviewed  Yes 1/10/2024  9:04 AM Rio Aviles DO            ED Provider  Electronically Signed by             Becki Lomeli PA-C  06/02/24 0851

## 2024-06-02 NOTE — Clinical Note
Garcia Cuevas was seen and treated in our emergency department on 6/2/2024.                Diagnosis: right rib injury    Garcia  may return to work on return date.    He may return on this date: 06/06/2024         If you have any questions or concerns, please don't hesitate to call.      Becki Lomeli PA-C    ______________________________           _______________          _______________  Hospital Representative                              Date                                Time

## 2024-07-08 ENCOUNTER — DOCUMENTATION (OUTPATIENT)
Dept: PSYCHIATRY | Facility: CLINIC | Age: 44
End: 2024-07-08

## 2024-07-08 DIAGNOSIS — F31.9 BIPOLAR 1 DISORDER (HCC): Primary | ICD-10-CM

## 2024-07-08 DIAGNOSIS — F41.9 ANXIETY: ICD-10-CM

## 2024-07-08 DIAGNOSIS — F43.12 POST-TRAUMATIC STRESS DISORDER, CHRONIC: ICD-10-CM

## 2024-07-08 DIAGNOSIS — F12.90 MARIJUANA USE, CONTINUOUS: Chronic | ICD-10-CM

## 2024-07-08 NOTE — PROGRESS NOTES
Psychotherapy Discharge Summary    Preferred Name: Garcia Cuevas  YOB: 1980    Admission date to psychotherapy: 2/14/24    Referred by: PCP Dr Ovalles    Presenting Problem: PTSD, anger, bipolar I disorder, drug addiction    Course of treatment included : individual therapy     Progress/Outcome of Treatment Goals (brief summary of course of treatment) Ct was engaged during most of the few sessions, but had difficulty continuing treatment, reporting he does not feel it is needed any longer. Ct reported being sober after IOP, learning to manage his behaviors and anger. However, ct did come to session on at least one occasion, high on marijuana. Ct did agree to refrain from smoking pot prior to sessions; however, writer observed in future session that ct was not completely sober, as evidenced by his overly cheerful affect, laughing, red eyes.  Ct did not remain in therapy long enough to fully develop relationship with writer, in writer's opinion, however, ct reported he completed his goals in treatment.    Treatment Complications (if any): none    Treatment Progress: fair    Current [facility] Psychiatric Provider: Ruby Card    Discharge Medications include: writer does not provided medicaitons    Discharge Date: 7/8/24    Discharge Diagnosis:   1. Bipolar 1 disorder (HCC)        2. Marijuana use, continuous        3. Anxiety        4. Post-traumatic stress disorder, chronic            Criteria for Discharge: completed treatment goals and objectives and is no longer in need of services    Aftercare recommendations include (include specific referral names and phone numbers, if appropriate): Ct will continue follow up with medication management and work towards maintaining sobriety    Prognosis: fair

## 2024-10-21 DIAGNOSIS — F31.9 BIPOLAR 1 DISORDER (HCC): ICD-10-CM

## 2024-10-21 NOTE — TELEPHONE ENCOUNTER
Reason for call:   [x] Refill   [] Prior Auth  [] Other:     Office:   [] PCP/Provider -   [x] Specialty/Provider - Deysi Tejada/[facility]       Medication: Depakote    Dose/Frequency: 500 mg 24 hr tablet    Quantity: #180    Pharmacy: [address]    Does the patient have enough for 3 days?   [x] Yes   [] No - Send as HP to POD

## 2024-10-22 RX ORDER — DIVALPROEX SODIUM 500 MG/1
1000 TABLET, FILM COATED, EXTENDED RELEASE ORAL
Qty: 60 TABLET | Refills: 0 | Status: SHIPPED | OUTPATIENT
Start: 2024-10-22

## 2024-10-23 ENCOUNTER — TELEPHONE (OUTPATIENT)
Dept: PSYCHIATRY | Facility: CLINIC | Age: 44
End: 2024-10-23

## 2024-10-28 DIAGNOSIS — F31.9 BIPOLAR 1 DISORDER (HCC): ICD-10-CM

## 2024-10-28 NOTE — TELEPHONE ENCOUNTER
Garcia needs to schedule a follow up appointment in order to receive refills. I called Garcia and informed him, he said he will do that right now.

## 2024-10-28 NOTE — TELEPHONE ENCOUNTER
ALFONSO left on pharmacy providers line informing him they can fill the script written on 10/22 and to call us back if more information is required.

## 2024-10-28 NOTE — TELEPHONE ENCOUNTER
Patient called the RX Refill Line. Message is being forwarded to the office.     Patient called stating that [facility]/pharmacy #[id number] - [address] [[phone]] informed the patient that they are waiting for clearance from the Doctor to refill divalproex sodium (DEPAKOTE ER) 500 mg. I did inform the patient that a refill was sent to the pharmacy on 10/22/24 for 60 tablets. Please contact the pharmacy to find out what is needed to refill this for the patient. Patient is out of medication. Patient would like to be contacted after the pharmacy is contacted.     Please contact patient at [phone]

## 2024-10-31 DIAGNOSIS — F31.9 BIPOLAR 1 DISORDER (HCC): ICD-10-CM

## 2024-10-31 RX ORDER — RISPERIDONE 2 MG/1
2 TABLET ORAL 2 TIMES DAILY
Qty: 60 TABLET | Refills: 1 | Status: SHIPPED | OUTPATIENT
Start: 2024-10-31

## 2024-11-06 ENCOUNTER — OFFICE VISIT (OUTPATIENT)
Dept: PSYCHIATRY | Facility: CLINIC | Age: 44
End: 2024-11-06
Payer: COMMERCIAL

## 2024-11-06 DIAGNOSIS — G47.09 OTHER INSOMNIA: ICD-10-CM

## 2024-11-06 DIAGNOSIS — F31.9 BIPOLAR 1 DISORDER (HCC): Primary | ICD-10-CM

## 2024-11-06 DIAGNOSIS — F12.90 MARIJUANA USE, CONTINUOUS: ICD-10-CM

## 2024-11-06 PROCEDURE — 99214 OFFICE O/P EST MOD 30 MIN: CPT | Performed by: PHYSICIAN ASSISTANT

## 2024-11-06 RX ORDER — DIVALPROEX SODIUM 500 MG/1
TABLET, FILM COATED, EXTENDED RELEASE ORAL
Qty: 45 TABLET | Refills: 0 | Status: SHIPPED | OUTPATIENT
Start: 2024-11-06

## 2024-11-07 NOTE — PSYCH
"  This note was not shared with the patient due to reasonable likelihood of causing patient harm   PROGRESS NOTE        [facility]      Name and Date of Birth:  Garcia Cuevas 44 y.o. 1980    Date of Visit: 11/6/24    SUBJECTIVE:    Garcia presents today for medication management and follow up.  He was last seen 3/20/2024.  He reports \"I have been doing okay other than coming off of my Depakote.\"  He shares that prior to taking Depakote he was very irritable and would often start fights with his family.  Since being on Depakote he reports his mood has been stable.  He has not been following up regularly for appointments.  He stopped seeing his psychotherapist.  He ended up running out of his medication and he has been off of his Depakote for 6 to 7 days.  He notes feeling more irritable.  He has been having more trouble with sleep.  He shares that he is still able to function such as going to work completing his ADLs etc.  He has maintained his dosing of Risperdal.    He excitedly shares that his new job is going well.  He reports that he has goals to go back to school and become a manager.    He denies suicidal ideation, intent or plan at present, has no suicidal ideation, intent or plan at present.    He denies any auditory hallucinations and visual hallucinations, denies any other delusional thinking, denies any delusional thinking.    He denies any side effects from medications  .  HPI ROS Appetite Changes and Sleep: normal appetite, normal sleep    Review Of Systems:      Constitutional Negative   ENT Negative   Cardiovascular Negative   Respiratory Negative   Gastrointestinal Negative   Genitourinary Negative   Musculoskeletal Negative   Integumentary Negative   Neurological Negative   Endocrine Negative   Other Symptoms Negative and None       Laboratory Results: No results found for this or any previous visit.    Substance Abuse History:    Social History "     Substance and Sexual Activity   Drug Use Yes    Types: Marijuana, MDMA (ecstacy)    Comment: not on medical marijuana; obtains from dispensary. MDMA in the past last used summer 2023, first used in high school, restarted weekly since 2002 when dishonorably discharged from [employer]       Family Psychiatric History:     Family History   Problem Relation Age of Onset    Heart disease Mother     Osteoporosis Mother     Drug abuse Father     Alcohol abuse Father     HIV Sister     No Known Problems Brother     Drug abuse Maternal Uncle     Drug abuse Paternal Uncle        The following portions of the patient's history were reviewed and updated as appropriate: past family history, past medical history, past social history, past surgical history and problem list.    Social History     Socioeconomic History    Marital status: /Civil Union     Spouse name: Not on file    Number of children: 3    Years of education: some college    Highest education level: Some college, no degree   Occupational History    Occupation:  - [employer]   Tobacco Use    Smoking status: Every Day     Current packs/day: 0.50     Average packs/day: 1.4 packs/day for 10.5 years (14.9 ttl pk-yrs)     Types: Cigarettes     Start date: 2014     Last attempt to quit: 8/25/2023    Smokeless tobacco: Never    Tobacco comments:     trying to quit under 6 cigrates a day   Vaping Use    Vaping status: Never Used   Substance and Sexual Activity    Alcohol use: Yes     Alcohol/week: 10.0 standard drinks of alcohol     Types: 10 Cans of beer per week    Drug use: Yes     Types: Marijuana, MDMA (ecstacy)     Comment: not on medical marijuana; obtains from dispensary. MDMA in the past last used summer 2023, first used in high school, restarted weekly since 2002 when dishonorably discharged from [employer]    Sexual activity: Not Currently     Partners: Female   Other Topics Concern    Not on file   Social History Narrative    Not on file  "    Social Determinants of Health     Financial Resource Strain: Not on file   Food Insecurity: Not on file   Transportation Needs: Not on file   Physical Activity: Not on file   Stress: Not on file   Social Connections: Not on file   Intimate Partner Violence: Low Risk  (2/20/2022)    [facility]    Intimate Partner Violence     Insults You: Not on file     Threatens You: Not on file     Screams at You: Not on file     Physically Hurt: Not on file     Intimate Partner Violence Score: Not on file   Housing Stability: Not on file     Social History     Social History Narrative    Not on file        Social History       Tobacco History       Smoking Status  Every Day Smoking Start Date  2014 Last Attempt to Quit  8/25/2023 Current Packs/Day  0.5 packs/day Average Packs/Day  1.5 packs/day for 9.9 years (14.6 ttl pk-yrs)    Smoking Tobacco Type  Cigarettes started 2014 and last attempt to quit 8/25/2023   Pack Year History     Packs/Day From To Years    0.5 12/20/2023  0.2    0 8/25/2023 12/20/2023 0.3    1.5 2014 8/25/2023 9.6      Smokeless Tobacco Use  Never      Tobacco Comments  trying to quit under 6 cigrates a day              Alcohol History       Alcohol Use Status  Yes Drinks/Week  10 Cans of beer per week Amount  10.0 standard drinks of alcohol/wk              Drug Use       Drug Use Status  Yes Types  MDMA (ecstacy), Marijuana Comment  not on medical marijuana; obtains from dispensary. MDMA in the past last used summer 2023, first used in high school, restarted weekly since 2002 when dishonorably discharged from               Sexual Activity       Sexually Active  Not Currently Partners  Female              Activities of Daily Living    Not Asked                       OBJECTIVE:     Mental Status Evaluation:    Appearance age appropriate, casually dressed   Behavior pleasant, cooperative   Speech normal volume, normal pitch   Mood Euthymic \"a little on edge\"   Affect " Mood congruent    Thought Processes logical   Associations intact associations   Thought Content normal   Perceptual Disturbances: none   Abnormal Thoughts  Risk Potential Suicidal ideation - None  Homicidal ideation - None  Potential for aggression - No   Orientation oriented to person, place, time/date and situation   Memory recent and remote memory grossly intact   Cosciousness alert and awake   Attention Span attention span and concentration are age appropriate   Intellect Appears to be of Average Intelligence   Insight age appropriate    Judgement good    Muscle Strength and  Gait muscle strength and tone were normal   Language no difficulty naming common objects   Fund of Knowledge displays adequate knowledge of current events   Pain none   Pain Scale 0       Assessment/Plan:      Assessment & Plan  Bipolar 1 disorder (HCC)  Continue Risperdal 2 mg twice daily for mood  Due to coming off of Depakote due to not being able to obtain the medication.  Will restart.  He will start with Depakote  mg at bedtime for 2 weeks.  On day 16 he will increase himself to 1000 mg nightly.  He will then be seen in the office and will order monitoring labs.      Orders:    divalproex sodium (DEPAKOTE ER) 500 mg 24 hr tablet; Take one tablet (500 mg) at bedtime for 15 days, on day 16 increase to taking 2 tablets nightly (1000 mg) for mood.    Marijuana use, continuous  Continue to provide psychoeducation on marijuana effects on mood         Other insomnia  Continue current regimen              Treatment Recommendations/Precautions:    Patient ran out of his Depakote and has been off of it for 6 to 7 days.    Will restart Depakote ER for mood at 500 mg at bedtime.  In 2 weeks he was instructed to increase himself to taking 2 tabs for 1000 mg at bedtime.  He will be seen in the office at that time and labs will be ordered for monitoring.  He currently denies any symptoms of jamarcus.  He presents with a logical thought process.   He has been able to maintain safe behaviors.  We did discuss the importance of following up regularly to continue on his medications.    Provided psychoeducation on marijuana adversely affecting mood on the long-term.  He continues to use marijuana daily.    Continue current medications:    Risperdal 2 mg BID for mood      We will follow up in 4 weeks or sooner if questions or concerns arise.   He is aware of emergent vs non-emergent mental health resources  He is able to contract for his own safety at this time       Risks/Benefits      Risks, Benefits And Possible Side Effects Of Medications:    Risks, benefits, and possible side effects of medications explained to patient and patient verbalizes understanding and agreement for treatment.    Controlled Medication Discussion:     Not applicable    Psychotherapy Provided:     Individual psychotherapy provided: No  Visit Start Time:  900 AM  Visit End Time:  925 AM  Total Visit Duration: 25 minutes    This note was completed in part utilizing Dragon dictation Software. Grammatical, translation, syntax errors, random word insertions, spelling mistakes, and incomplete sentences may be an occasional consequence of this system secondary to software limitations with voice recognition, ambient noise, and hardware issues. If you have any questions or concerns about the content, text, or information contained within the body of this dictation, please contact the provider for clarification.

## 2024-11-07 NOTE — ASSESSMENT & PLAN NOTE
Continue Risperdal 2 mg twice daily for mood  Due to coming off of Depakote due to not being able to obtain the medication.  Will restart.  He will start with Depakote  mg at bedtime for 2 weeks.  On day 16 he will increase himself to 1000 mg nightly.  He will then be seen in the office and will order monitoring labs.

## 2024-11-26 DIAGNOSIS — F31.9 BIPOLAR 1 DISORDER (HCC): ICD-10-CM

## 2024-11-26 RX ORDER — RISPERIDONE 2 MG/1
2 TABLET ORAL 2 TIMES DAILY
Qty: 180 TABLET | Refills: 1 | Status: SHIPPED | OUTPATIENT
Start: 2024-11-26 | End: 2024-12-04

## 2024-11-28 DIAGNOSIS — F31.9 BIPOLAR 1 DISORDER (HCC): ICD-10-CM

## 2024-11-29 DIAGNOSIS — F31.9 BIPOLAR 1 DISORDER (HCC): ICD-10-CM

## 2024-11-29 RX ORDER — DIVALPROEX SODIUM 500 MG/1
1000 TABLET, FILM COATED, EXTENDED RELEASE ORAL
Qty: 180 TABLET | Refills: 0 | Status: SHIPPED | OUTPATIENT
Start: 2024-11-29 | End: 2025-02-27

## 2024-11-29 RX ORDER — DIVALPROEX SODIUM 500 MG/1
TABLET, FILM COATED, EXTENDED RELEASE ORAL
Qty: 135 TABLET | Refills: 1 | OUTPATIENT
Start: 2024-11-29

## 2024-12-04 ENCOUNTER — OFFICE VISIT (OUTPATIENT)
Dept: PSYCHIATRY | Facility: CLINIC | Age: 44
End: 2024-12-04
Payer: COMMERCIAL

## 2024-12-04 DIAGNOSIS — G47.09 OTHER INSOMNIA: ICD-10-CM

## 2024-12-04 DIAGNOSIS — Z79.899 HIGH RISK MEDICATION USE: ICD-10-CM

## 2024-12-04 DIAGNOSIS — F12.90 MARIJUANA USE, CONTINUOUS: ICD-10-CM

## 2024-12-04 DIAGNOSIS — F31.9 BIPOLAR 1 DISORDER (HCC): Primary | ICD-10-CM

## 2024-12-04 PROCEDURE — 99214 OFFICE O/P EST MOD 30 MIN: CPT | Performed by: PHYSICIAN ASSISTANT

## 2024-12-04 RX ORDER — RISPERIDONE 2 MG/1
2 TABLET ORAL DAILY
Qty: 180 TABLET | Refills: 1 | Status: SHIPPED | OUTPATIENT
Start: 2024-12-04

## 2024-12-04 NOTE — PSYCH
This note was not shared with the patient due to reasonable likelihood of causing patient harm   PROGRESS NOTE        [facility]      Name and Date of Birth:  Garcia Cuevas 44 y.o. 1980    Date of Visit: 12/04/24     SUBJECTIVE:    Garcia presents today for medication management and follow up.  He shares that he has been consistent with his medications.  He has not noticed any negative side effects.  He shares that his mood has returned to baseline.  He shares that prior to the last appointment when he had come off of Depakote for few days he was starting to feel more irritable.  With more investigation he reports that he is only taking 1 tablet of his Risperdal.  Overall he feels that his mood is back to baseline.  He notes that he was starting to isolate a little bit when he was off of his medicine and now he is spending more time with family.  He is doing very well at work and is enjoying his job.  He has been getting adequate nutrition.  We discussed the increase in appetite likely due to medication.  He has been able to get in healthier options versus increasing his food intake.    He has been getting 6 to 7 hours of sleep per day.    He denies suicidal ideation, intent or plan at present, has no suicidal ideation, intent or plan at present.    He denies any auditory hallucinations and visual hallucinations, denies any other delusional thinking, denies any delusional thinking.    He denies any side effects from medications  .  HPI ROS Appetite Changes and Sleep: Increased appetite, normal sleep    Review Of Systems:      Constitutional Negative   ENT Negative   Cardiovascular Negative   Respiratory Negative   Gastrointestinal Negative   Genitourinary Negative   Musculoskeletal Negative   Integumentary Negative   Neurological Negative   Endocrine Negative   Other Symptoms Negative and None       Laboratory Results: No results found for this or any previous  visit.    Substance Abuse History:    Social History     Substance and Sexual Activity   Drug Use Yes    Types: Marijuana, MDMA (ecstacy)    Comment: not on medical marijuana; obtains from dispensary. MDMA in the past last used summer 2023, first used in high school, restarted weekly since 2002 when dishonorably discharged from [employer]       Family Psychiatric History:     Family History   Problem Relation Age of Onset    Heart disease Mother     Osteoporosis Mother     Drug abuse Father     Alcohol abuse Father     HIV Sister     No Known Problems Brother     Drug abuse Maternal Uncle     Drug abuse Paternal Uncle        The following portions of the patient's history were reviewed and updated as appropriate: past family history, past medical history, past social history, past surgical history and problem list.    Social History     Socioeconomic History    Marital status: /Civil Union     Spouse name: Not on file    Number of children: 3    Years of education: some college    Highest education level: Some college, no degree   Occupational History    Occupation:  - [employer]   Tobacco Use    Smoking status: Every Day     Current packs/day: 0.50     Average packs/day: 1.4 packs/day for 10.6 years (14.9 ttl pk-yrs)     Types: Cigarettes     Start date: 2014     Last attempt to quit: 8/25/2023    Smokeless tobacco: Never    Tobacco comments:     trying to quit under 6 cigrates a day   Vaping Use    Vaping status: Never Used   Substance and Sexual Activity    Alcohol use: Yes     Alcohol/week: 10.0 standard drinks of alcohol     Types: 10 Cans of beer per week    Drug use: Yes     Types: Marijuana, MDMA (ecstacy)     Comment: not on medical marijuana; obtains from dispensary. MDMA in the past last used summer 2023, first used in high school, restarted weekly since 2002 when dishonorably discharged from [employer]    Sexual activity: Not Currently     Partners: Female   Other Topics Concern     Not on file   Social History Narrative    Not on file     Social Drivers of Health     Financial Resource Strain: Not on file   Food Insecurity: Not on file   Transportation Needs: Not on file   Physical Activity: Not on file   Stress: Not on file   Social Connections: Not on file   Intimate Partner Violence: Low Risk  (2/20/2022)    [facility]    Intimate Partner Violence     Insults You: Not on file     Threatens You: Not on file     Screams at You: Not on file     Physically Hurt: Not on file     Intimate Partner Violence Score: Not on file   Housing Stability: Not on file     Social History     Social History Narrative    Not on file        Social History       Tobacco History       Smoking Status  Every Day Smoking Start Date  2014 Last Attempt to Quit  8/25/2023 Current Packs/Day  0.5 packs/day Average Packs/Day  1.5 packs/day for 9.9 years (14.6 ttl pk-yrs)    Smoking Tobacco Type  Cigarettes started 2014 and last attempt to quit 8/25/2023   Pack Year History     Packs/Day From To Years    0.5 12/20/2023  0.2    0 8/25/2023 12/20/2023 0.3    1.5 2014 8/25/2023 9.6      Smokeless Tobacco Use  Never      Tobacco Comments  trying to quit under 6 cigrates a day              Alcohol History       Alcohol Use Status  Yes Drinks/Week  10 Cans of beer per week Amount  10.0 standard drinks of alcohol/wk              Drug Use       Drug Use Status  Yes Types  MDMA (ecstacy), Marijuana Comment  not on medical marijuana; obtains from dispensary. MDMA in the past last used summer 2023, first used in high school, restarted weekly since 2002 when dishonorably discharged from               Sexual Activity       Sexually Active  Not Currently Partners  Female              Activities of Daily Living    Not Asked                       OBJECTIVE:     Mental Status Evaluation:    Appearance age appropriate, casually dressed   Behavior pleasant, cooperative   Speech normal volume, normal  pitch   Mood Euthymic    Affect Mood congruent    Thought Processes logical   Associations intact associations   Thought Content normal   Perceptual Disturbances: none   Abnormal Thoughts  Risk Potential Suicidal ideation - None  Homicidal ideation - None  Potential for aggression - No   Orientation oriented to person, place, time/date and situation   Memory recent and remote memory grossly intact   Cosciousness alert and awake   Attention Span attention span and concentration are age appropriate   Intellect Appears to be of Average Intelligence   Insight age appropriate    Judgement good    Muscle Strength and  Gait muscle strength and tone were normal   Language no difficulty naming common objects   Fund of Knowledge displays adequate knowledge of current events   Pain none   Pain Scale 0       Assessment/Plan:      Assessment & Plan  Bipolar 1 disorder (HCC)  Continue Risperdal 2 mg daily  Continue Depakote ER 1000 mg nightly    Orders:    risperiDONE (RisperDAL) 2 mg tablet; Take 1 tablet (2 mg total) by mouth daily    Marijuana use, continuous  Provided psychoeducation         Other insomnia  Fairly well-controlled at this time         High risk medication use  Valproic acid level, CBC, CMP, TSH, T4, A1c, and lipid panel ordered for monitoring.    Orders:    Hemoglobin A1C; Future    LIPID PANEL + LDL/HDL RATIO; Future    CBC and differential; Future    Comprehensive metabolic panel; Future    TSH, 3rd generation with Free T4 reflex; Future    Valproic acid level, total; Future         Treatment Recommendations/Precautions:    Patient reports no negative side effects since increasing Depakote ER back to 1000 mg daily.  Labs ordered for monitoring.  He is aware to obtain the lab prior to taking his medication.  Today he shares that he is only taking 1 Risperdal tablet daily which means he is only taking 2 mg daily.  Updated chart.  Discussed that as long as his mood remains stable we can remain on this dosing.   Will obtain valproic acid level and make sure he is in the therapeutic range.     We will follow up in 4 weeks or sooner if questions or concerns arise.   He is aware of emergent vs non-emergent mental health resources  He is able to contract for his own safety at this time       Risks/Benefits      Risks, Benefits And Possible Side Effects Of Medications:    Risks, benefits, and possible side effects of medications explained to patient and patient verbalizes understanding and agreement for treatment.    Controlled Medication Discussion:     Not applicable    Psychotherapy Provided:     Individual psychotherapy provided: No  Visit Start Time:  830 AM  Visit End Time:  855 AM  Total Visit Duration: 25 minutes    This note was completed in part utilizing Dragon dictation Software. Grammatical, translation, syntax errors, random word insertions, spelling mistakes, and incomplete sentences may be an occasional consequence of this system secondary to software limitations with voice recognition, ambient noise, and hardware issues. If you have any questions or concerns about the content, text, or information contained within the body of this dictation, please contact the provider for clarification.

## 2025-01-06 ENCOUNTER — APPOINTMENT (OUTPATIENT)
Dept: LAB | Facility: CLINIC | Age: 45
End: 2025-01-06
Payer: COMMERCIAL

## 2025-01-06 DIAGNOSIS — Z79.899 HIGH RISK MEDICATION USE: ICD-10-CM

## 2025-01-06 LAB
ALBUMIN SERPL BCG-MCNC: 4 G/DL (ref 3.5–5)
ALP SERPL-CCNC: 57 U/L (ref 34–104)
ALT SERPL W P-5'-P-CCNC: 10 U/L (ref 7–52)
ANION GAP SERPL CALCULATED.3IONS-SCNC: 6 MMOL/L (ref 4–13)
AST SERPL W P-5'-P-CCNC: 12 U/L (ref 13–39)
BASOPHILS # BLD MANUAL: 0 THOUSAND/UL (ref 0–0.1)
BASOPHILS NFR MAR MANUAL: 0 % (ref 0–1)
BILIRUB SERPL-MCNC: 0.34 MG/DL (ref 0.2–1)
BUN SERPL-MCNC: 15 MG/DL (ref 5–25)
CALCIUM SERPL-MCNC: 8.6 MG/DL (ref 8.4–10.2)
CHLORIDE SERPL-SCNC: 108 MMOL/L (ref 96–108)
CHOLEST SERPL-MCNC: 149 MG/DL (ref ?–200)
CO2 SERPL-SCNC: 27 MMOL/L (ref 21–32)
CREAT SERPL-MCNC: 1.41 MG/DL (ref 0.6–1.3)
EOSINOPHIL # BLD MANUAL: 0 THOUSAND/UL (ref 0–0.4)
EOSINOPHIL NFR BLD MANUAL: 0 % (ref 0–6)
ERYTHROCYTE [DISTWIDTH] IN BLOOD BY AUTOMATED COUNT: 12.8 % (ref 11.6–15.1)
EST. AVERAGE GLUCOSE BLD GHB EST-MCNC: 117 MG/DL
GFR SERPL CREATININE-BSD FRML MDRD: 59 ML/MIN/1.73SQ M
GLUCOSE P FAST SERPL-MCNC: 104 MG/DL (ref 65–99)
HBA1C MFR BLD: 5.7 %
HCT VFR BLD AUTO: 42 % (ref 36.5–49.3)
HDLC SERPL-MCNC: 37 MG/DL
HGB BLD-MCNC: 13.8 G/DL (ref 12–17)
LDLC SERPL CALC-MCNC: 71 MG/DL (ref 0–100)
LYMPHOCYTES # BLD AUTO: 4.91 THOUSAND/UL (ref 0.6–4.47)
LYMPHOCYTES # BLD AUTO: 49 % (ref 14–44)
MCH RBC QN AUTO: 30.1 PG (ref 26.8–34.3)
MCHC RBC AUTO-ENTMCNC: 32.9 G/DL (ref 31.4–37.4)
MCV RBC AUTO: 92 FL (ref 82–98)
MONOCYTES # BLD AUTO: 0.18 THOUSAND/UL (ref 0–1.22)
MONOCYTES NFR BLD: 2 % (ref 4–12)
NEUTROPHILS # BLD MANUAL: 3.84 THOUSAND/UL (ref 1.85–7.62)
NEUTS BAND NFR BLD MANUAL: 7 % (ref 0–8)
NEUTS SEG NFR BLD AUTO: 36 % (ref 43–75)
NONHDLC SERPL-MCNC: 112 MG/DL
NRBC BLD AUTO-RTO: 1 /100 WBC (ref 0–2)
PLATELET # BLD AUTO: 328 THOUSANDS/UL (ref 149–390)
PLATELET BLD QL SMEAR: ADEQUATE
PMV BLD AUTO: 9.6 FL (ref 8.9–12.7)
POTASSIUM SERPL-SCNC: 4.4 MMOL/L (ref 3.5–5.3)
PROT SERPL-MCNC: 6.7 G/DL (ref 6.4–8.4)
RBC # BLD AUTO: 4.59 MILLION/UL (ref 3.88–5.62)
RBC MORPH BLD: NORMAL
SODIUM SERPL-SCNC: 141 MMOL/L (ref 135–147)
TRIGL SERPL-MCNC: 205 MG/DL (ref ?–150)
TSH SERPL DL<=0.05 MIU/L-ACNC: 1.27 UIU/ML (ref 0.45–4.5)
VALPROATE SERPL-MCNC: 34 UG/ML (ref 50–100)
VARIANT LYMPHS # BLD AUTO: 6 %
WBC # BLD AUTO: 8.93 THOUSAND/UL (ref 4.31–10.16)

## 2025-01-06 PROCEDURE — 80053 COMPREHEN METABOLIC PANEL: CPT

## 2025-01-06 PROCEDURE — 85007 BL SMEAR W/DIFF WBC COUNT: CPT

## 2025-01-06 PROCEDURE — 83036 HEMOGLOBIN GLYCOSYLATED A1C: CPT

## 2025-01-06 PROCEDURE — 80164 ASSAY DIPROPYLACETIC ACD TOT: CPT

## 2025-01-06 PROCEDURE — 85027 COMPLETE CBC AUTOMATED: CPT

## 2025-01-06 PROCEDURE — 84443 ASSAY THYROID STIM HORMONE: CPT

## 2025-01-06 PROCEDURE — 80061 LIPID PANEL: CPT

## 2025-01-06 PROCEDURE — 36415 COLL VENOUS BLD VENIPUNCTURE: CPT

## 2025-01-15 ENCOUNTER — TELEPHONE (OUTPATIENT)
Age: 45
End: 2025-01-15

## 2025-01-15 NOTE — TELEPHONE ENCOUNTER
Patient is calling regarding cancelling an appointment.    Date/Time: 1/15/2025 8:30am    Reason: patient is incarcertaed    Patient was rescheduled: YES [] NO [x]  If yes, when was Patient reschedule for:     Patient requesting call back to reschedule: YES [] NO [x]

## 2025-01-17 ENCOUNTER — TELEPHONE (OUTPATIENT)
Age: 45
End: 2025-01-17

## 2025-01-17 NOTE — TELEPHONE ENCOUNTER
Patient contacted the office to schedule a follow up visit with provider. Patient is now scheduled for 1/29  at 9 in office.

## 2025-01-29 ENCOUNTER — OFFICE VISIT (OUTPATIENT)
Dept: PSYCHIATRY | Facility: CLINIC | Age: 45
End: 2025-01-29
Payer: COMMERCIAL

## 2025-01-29 DIAGNOSIS — F31.9 BIPOLAR 1 DISORDER (HCC): Primary | ICD-10-CM

## 2025-01-29 PROCEDURE — 99214 OFFICE O/P EST MOD 30 MIN: CPT | Performed by: PHYSICIAN ASSISTANT

## 2025-01-29 RX ORDER — RISPERIDONE 3 MG/1
3 TABLET ORAL DAILY
Qty: 30 TABLET | Refills: 2 | Status: SHIPPED | OUTPATIENT
Start: 2025-01-29 | End: 2025-04-29

## 2025-01-30 ENCOUNTER — TELEPHONE (OUTPATIENT)
Age: 45
End: 2025-01-30

## 2025-01-30 NOTE — ASSESSMENT & PLAN NOTE
Continue Depakote ER 1000 mg daily at bedtime  Will increase Risperdal to 3 mg daily for mood   placed internal referral for psychotherapy

## 2025-01-30 NOTE — PSYCH
"  This note was not shared with the patient due to reasonable likelihood of causing patient harm   PROGRESS NOTE        [facility]      Name and Date of Birth:  Quentin Cuevas 44 y.o. 1980    Date of Visit: 01/29/25     SUBJECTIVE:    Quentin presents today for medication management and follow up.  He reports that he did spend 1 night in penitentiary after turning himself in for \"selling to guns illegally.\"  He shares that this occurred over the summer when he was experiencing depressive symptoms.  He shares that on the 14th of this month he decided to turn himself in.  He notes \"it had been waiting on me for a while.\"  He also shares that recently he has been struggling with his mother's health.  She recently found out that her cancer spread from her lungs to her stomach.  She lives over an hour away alone and quentin spends a lot of time with her.  He is tearful when talking about this.  He shares that he was hoping to get a mental health assessment done by this provider.  We discussed that he would need to see a provider that does court evals or he can request records.  He is interested in a referral for psychotherapy.    He denies any overt symptoms of jamarcus at this time but he does report depressive symptoms.  He is not taking his medication as prescribed and has only been taking half of his Depakote dosing.  At the last encounter he had reported only taking half of his Risperdal dosing.    He denies suicidal ideation, intent or plan at present, has no suicidal ideation, intent or plan at present.    He denies any auditory hallucinations and visual hallucinations, denies any other delusional thinking, denies any delusional thinking.    He denies any side effects from medications  .  HPI ROS Appetite Changes and Sleep: Adequate sleep, adequate appetite    Review Of Systems:      Constitutional Negative   ENT Negative   Cardiovascular Negative   Respiratory Negative "   Gastrointestinal Negative   Genitourinary Negative   Musculoskeletal Negative   Integumentary Negative   Neurological Negative   Endocrine Negative   Other Symptoms Negative and None       Laboratory Results: No results found for this or any previous visit.    Substance Abuse History:    Social History     Substance and Sexual Activity   Drug Use Yes    Types: Marijuana, MDMA (ecstacy)    Comment: not on medical marijuana; obtains from dispensary. MDMA in the past last used summer 2023, first used in high school, restarted weekly since 2002 when dishonorably discharged from        Family Psychiatric History:     Family History   Problem Relation Age of Onset    Heart disease Mother     Osteoporosis Mother     Drug abuse Father     Alcohol abuse Father     HIV Sister     No Known Problems Brother     Drug abuse Maternal Uncle     Drug abuse Paternal Uncle        The following portions of the patient's history were reviewed and updated as appropriate: past family history, past medical history, past social history, past surgical history and problem list.    Social History     Socioeconomic History    Marital status: /Civil Union     Spouse name: Not on file    Number of children: 3    Years of education: some college    Highest education level: Some college, no degree   Occupational History    Occupation:  - [employer]   Tobacco Use    Smoking status: Every Day     Current packs/day: 0.50     Average packs/day: 1.4 packs/day for 10.8 years (15.0 ttl pk-yrs)     Types: Cigarettes     Start date: 2014     Last attempt to quit: 8/25/2023    Smokeless tobacco: Never    Tobacco comments:     trying to quit under 6 cigrates a day   Vaping Use    Vaping status: Never Used   Substance and Sexual Activity    Alcohol use: Yes     Alcohol/week: 10.0 standard drinks of alcohol     Types: 10 Cans of beer per week    Drug use: Yes     Types: Marijuana, MDMA (ecstacy)     Comment: not on medical  marijuana; obtains from dispensary. MDMA in the past last used summer 2023, first used in high school, restarted weekly since 2002 when dishonorably discharged from     Sexual activity: Not Currently     Partners: Female   Other Topics Concern    Not on file   Social History Narrative    Not on file     Social Drivers of Health     Financial Resource Strain: Not on file   Food Insecurity: Not on file   Transportation Needs: Not on file   Physical Activity: Not on file   Stress: Not on file   Social Connections: Not on file   Intimate Partner Violence: Low Risk  (2/20/2022)    Received from [facility], [facility]    Intimate Partner Violence     Insults You: Not on file     Threatens You: Not on file     Screams at You: Not on file     Physically Hurt: Not on file     Intimate Partner Violence Score: Not on file   Housing Stability: Not on file     Social History     Social History Narrative    Not on file        Social History       Tobacco History       Smoking Status  Every Day Smoking Start Date  2014 Last Attempt to Quit  8/25/2023 Current Packs/Day  0.5 packs/day Average Packs/Day  1.5 packs/day for 9.9 years (14.6 ttl pk-yrs)    Smoking Tobacco Type  Cigarettes started 2014 and last attempt to quit 8/25/2023   Pack Year History     Packs/Day From To Years    0.5 12/20/2023  0.2    0 8/25/2023 12/20/2023 0.3    1.5 2014 8/25/2023 9.6      Smokeless Tobacco Use  Never      Tobacco Comments  trying to quit under 6 cigrates a day              Alcohol History       Alcohol Use Status  Yes Drinks/Week  10 Cans of beer per week Amount  10.0 standard drinks of alcohol/wk              Drug Use       Drug Use Status  Yes Types  MDMA (ecstacy), Marijuana Comment  not on medical marijuana; obtains from dispensary. MDMA in the past last used summer 2023, first used in high school, restarted weekly since 2002 when dishonorably discharged from               Sexual Activity       Sexually Active  Not  Currently Partners  Female              Activities of Daily Living    Not Asked                       OBJECTIVE:     Mental Status Evaluation:    Appearance age appropriate, casually dressed   Behavior Slightly guarded, tearful at times   Speech normal volume, normal pitch   Mood Depressed, anxious   Affect Mood congruent    Thought Processes logical   Associations intact associations   Thought Content normal   Perceptual Disturbances: none   Abnormal Thoughts  Risk Potential Suicidal ideation - None  Homicidal ideation - None  Potential for aggression - No   Orientation oriented to person, place, time/date and situation   Memory recent and remote memory grossly intact   Cosciousness alert and awake   Attention Span attention span and concentration are age appropriate   Intellect Appears to be of Average Intelligence   Insight age appropriate    Judgement good    Muscle Strength and  Gait muscle strength and tone were normal   Language no difficulty naming common objects   Fund of Knowledge displays adequate knowledge of current events   Pain none   Pain Scale 0       Assessment/Plan:      Assessment & Plan  Bipolar 1 disorder (HCC)  Continue Depakote ER 1000 mg daily at bedtime  Will increase Risperdal to 3 mg daily for mood   placed internal referral for psychotherapy      Orders:    risperiDONE (RisperDAL) 3 mg tablet; Take 1 tablet (3 mg total) by mouth daily         Treatment Recommendations/Precautions:      Patient is often noncompliant with his dosing of his medication.  He shares that he has only been taking one of his Depakote tablets.  He was instructed to increase back to 1000 mg.  We also discussed importance of utilizing his medication to help with his symptoms.  Increased Risperdal to 3 mg daily for mood.  He is looking for mental health assessment for his pretrial investigation.  Reported that we do not complete court appointed evals.  However he is aware that he may request that records from his  appointments be sent if needed.    We will follow up in 4 weeks or sooner if questions or concerns arise.   He is aware of emergent vs non-emergent mental health resources  He is able to contract for his own safety at this time       Risks/Benefits      Risks, Benefits And Possible Side Effects Of Medications:    Risks, benefits, and possible side effects of medications explained to patient and patient verbalizes understanding and agreement for treatment.    Controlled Medication Discussion:     Not applicable    Psychotherapy Provided:     Individual psychotherapy provided: No  Visit Start Time:  900 AM  Visit End Time:  925 AM  Total Visit Duration: 25 minutes    This note was completed in part utilizing Dragon dictation Software. Grammatical, translation, syntax errors, random word insertions, spelling mistakes, and incomplete sentences may be an occasional consequence of this system secondary to software limitations with voice recognition, ambient noise, and hardware issues. If you have any questions or concerns about the content, text, or information contained within the body of this dictation, please contact the provider for clarification.

## 2025-01-30 NOTE — TELEPHONE ENCOUNTER
----- Message from Ruby Card PA-C sent at 1/29/2025  4:19 PM EST -----  Regarding: Therapy referral  Oj Cagle,     Can you please place Garcia on the wait list for therapy?    Thank you!    Ruby

## 2025-02-12 ENCOUNTER — TELEPHONE (OUTPATIENT)
Dept: PSYCHIATRY | Facility: CLINIC | Age: 45
End: 2025-02-12

## 2025-02-23 DIAGNOSIS — F31.9 BIPOLAR 1 DISORDER (HCC): ICD-10-CM

## 2025-02-24 RX ORDER — DIVALPROEX SODIUM 500 MG/1
1000 TABLET, FILM COATED, EXTENDED RELEASE ORAL
Qty: 180 TABLET | Refills: 0 | Status: SHIPPED | OUTPATIENT
Start: 2025-02-24

## 2025-02-26 ENCOUNTER — OFFICE VISIT (OUTPATIENT)
Dept: PSYCHIATRY | Facility: CLINIC | Age: 45
End: 2025-02-26
Payer: COMMERCIAL

## 2025-02-26 DIAGNOSIS — F31.9 BIPOLAR 1 DISORDER (HCC): Primary | ICD-10-CM

## 2025-02-26 DIAGNOSIS — F31.9 BIPOLAR 1 DISORDER (HCC): ICD-10-CM

## 2025-02-26 DIAGNOSIS — G47.09 OTHER INSOMNIA: ICD-10-CM

## 2025-02-26 PROCEDURE — 99213 OFFICE O/P EST LOW 20 MIN: CPT | Performed by: PHYSICIAN ASSISTANT

## 2025-02-26 RX ORDER — RISPERIDONE 3 MG/1
3 TABLET ORAL DAILY
Qty: 90 TABLET | Refills: 0 | Status: SHIPPED | OUTPATIENT
Start: 2025-02-26 | End: 2025-05-27

## 2025-02-26 NOTE — ASSESSMENT & PLAN NOTE
Continue Depakote ER 1000 mg daily at bedtime  Risperdal 3 mg daily for mood  Obtain  level prior to next appointment

## 2025-02-26 NOTE — PSYCH
This note was not shared with the patient due to reasonable likelihood of causing patient harm   PROGRESS NOTE        [facility]      Name and Date of Birth:  Garcia Cuevas 44 y.o. 1980    Date of Visit: 02/26/25     SUBJECTIVE:    Garcia presents today for medication management and follow up.  Patient appears brighter versus last encounter.  He does mention that he had a panic attack 2 weeks ago.  He shares he had not experienced this in several years.  He also shares that along the same timeline he cut down his marijuana use significantly.  When asked about the reasoning behind this he does not give a clear answer.  This provider gave positive feedback about this change.  He shares that he has been more consistent with his medications.  He reports that his mood has improved.  He denies any significant irritability.  He denies any symptoms of jamarcus.  He shares that since he decreased marijuana use his appetite has decreased.  He denies any significant weight loss.  He reports that he has been snacking during the day and when he takes his medication.  He reports that he has been less isolative in his home.    We discussed upcoming court date on Monday.  He shares that he does have representation.  He is nervous about it but he also shows remorse and communicates that he wants to take responsibility for his actions.    He has been sleeping well.  He reports getting 6 to 7 hours.      He denies suicidal ideation, intent or plan at present, has no suicidal ideation, intent or plan at present.    He denies any auditory hallucinations and visual hallucinations, denies any other delusional thinking, denies any delusional thinking.    He denies any side effects from medications  .  HPI ROS Appetite Changes and Sleep: Adequate sleep, adequate appetite    Review Of Systems:      Constitutional Negative   ENT Negative   Cardiovascular Negative   Respiratory Negative    Gastrointestinal Negative   Genitourinary Negative   Musculoskeletal Negative   Integumentary Negative   Neurological Negative   Endocrine Negative   Other Symptoms Negative and None       Laboratory Results: No results found for this or any previous visit.    Substance Abuse History:    Social History     Substance and Sexual Activity   Drug Use Yes    Types: Marijuana, MDMA (ecstacy)    Comment: not on medical marijuana; obtains from dispensary. MDMA in the past last used summer 2023, first used in high school, restarted weekly since 2002 when dishonorably discharged from        Family Psychiatric History:     Family History   Problem Relation Age of Onset    Heart disease Mother     Osteoporosis Mother     Drug abuse Father     Alcohol abuse Father     HIV Sister     No Known Problems Brother     Drug abuse Maternal Uncle     Drug abuse Paternal Uncle        The following portions of the patient's history were reviewed and updated as appropriate: past family history, past medical history, past social history, past surgical history and problem list.    Social History     Socioeconomic History    Marital status: /Civil Union     Spouse name: Not on file    Number of children: 3    Years of education: some college    Highest education level: Some college, no degree   Occupational History    Occupation:  - [employer]   Tobacco Use    Smoking status: Every Day     Current packs/day: 0.50     Average packs/day: 1.4 packs/day for 10.8 years (15.1 ttl pk-yrs)     Types: Cigarettes     Start date: 2014     Last attempt to quit: 8/25/2023    Smokeless tobacco: Never    Tobacco comments:     trying to quit under 6 cigrates a day   Vaping Use    Vaping status: Never Used   Substance and Sexual Activity    Alcohol use: Yes     Alcohol/week: 10.0 standard drinks of alcohol     Types: 10 Cans of beer per week    Drug use: Yes     Types: Marijuana, MDMA (ecstacy)     Comment: not on medical  marijuana; obtains from dispensary. MDMA in the past last used summer 2023, first used in high school, restarted weekly since 2002 when dishonorably discharged from     Sexual activity: Not Currently     Partners: Female   Other Topics Concern    Not on file   Social History Narrative    Not on file     Social Drivers of Health     Financial Resource Strain: Not on file   Food Insecurity: Not on file   Transportation Needs: Not on file   Physical Activity: Not on file   Stress: Not on file   Social Connections: Not on file   Intimate Partner Violence: Low Risk  (2/20/2022)    Received [facility]    Intimate Partner Violence     Insults You: Not on file     Threatens You: Not on file     Screams at You: Not on file     Physically Hurt: Not on file     Intimate Partner Violence Score: Not on file   Housing Stability: Not on file     Social History     Social History Narrative    Not on file        Social History       Tobacco History       Smoking Status  Every Day Smoking Start Date  2014 Last Attempt to Quit  8/25/2023 Current Packs/Day  0.5 packs/day Average Packs/Day  1.5 packs/day for 9.9 years (14.6 ttl pk-yrs)    Smoking Tobacco Type  Cigarettes started 2014 and last attempt to quit 8/25/2023   Pack Year History     Packs/Day From To Years    0.5 12/20/2023  0.2    0 8/25/2023 12/20/2023 0.3    1.5 2014 8/25/2023 9.6      Smokeless Tobacco Use  Never      Tobacco Comments  trying to quit under 6 cigrates a day              Alcohol History       Alcohol Use Status  Yes Drinks/Week  10 Cans of beer per week Amount  10.0 standard drinks of alcohol/wk              Drug Use       Drug Use Status  Yes Types  MDMA (ecstacy), Marijuana Comment  not on medical marijuana; obtains from dispensary. MDMA in the past last used summer 2023, first used in high school, restarted weekly since 2002 when dishonorably discharged from               Sexual Activity       Sexually Active  Not  Currently Partners  Female              Activities of Daily Living    Not Asked                       OBJECTIVE:     Mental Status Evaluation:    Appearance age appropriate, casually dressed   Behavior Cooperative, pleasant    Speech normal volume, normal pitch   Mood Euthymic   Affect Mood congruent    Thought Processes logical   Associations intact associations   Thought Content normal   Perceptual Disturbances: none   Abnormal Thoughts  Risk Potential Suicidal ideation - None  Homicidal ideation - None  Potential for aggression - No   Orientation oriented to person, place, time/date and situation   Memory recent and remote memory grossly intact   Cosciousness alert and awake   Attention Span attention span and concentration are age appropriate   Intellect Appears to be of Average Intelligence   Insight age appropriate    Judgement good    Muscle Strength and  Gait muscle strength and tone were normal   Language no difficulty naming common objects   Fund of Knowledge displays adequate knowledge of current events   Pain none   Pain Scale 0       Assessment/Plan:      Assessment & Plan  Bipolar 1 disorder (HCC)  Continue Depakote ER 1000 mg daily at bedtime  Risperdal 3 mg daily for mood  Obtain  level prior to next appointment     Orders:    risperiDONE (RisperDAL) 3 mg tablet; Take 1 tablet (3 mg total) by mouth daily    Valproic acid level, total; Future    Other insomnia  Fairly well controlled at this time              Treatment Recommendations/Precautions:        We will follow up in 4-6 weeks or sooner if questions or concerns arise.   He is aware of emergent vs non-emergent mental health resources  He is able to contract for his own safety at this time   He is awaiting the scheduling of psychotherapy       Risks/Benefits      Risks, Benefits And Possible Side Effects Of Medications:    Risks, benefits, and possible side effects of medications explained to patient and patient verbalizes understanding and  agreement for treatment.    Controlled Medication Discussion:     Not applicable    Psychotherapy Provided:     Individual psychotherapy provided: No  Visit Start Time:  930 AM  Visit End Time:  955 AM  Total Visit Duration: 25 minutes    This note was completed in part utilizing Dragon dictation Software. Grammatical, translation, syntax errors, random word insertions, spelling mistakes, and incomplete sentences may be an occasional consequence of this system secondary to software limitations with voice recognition, ambient noise, and hardware issues. If you have any questions or concerns about the content, text, or information contained within the body of this dictation, please contact the provider for clarification.

## 2025-02-27 RX ORDER — DIVALPROEX SODIUM 500 MG/1
1000 TABLET, FILM COATED, EXTENDED RELEASE ORAL
Qty: 180 TABLET | Refills: 0 | OUTPATIENT
Start: 2025-02-27

## 2025-04-10 ENCOUNTER — APPOINTMENT (OUTPATIENT)
Dept: LAB | Facility: CLINIC | Age: 45
End: 2025-04-10
Payer: COMMERCIAL

## 2025-04-10 DIAGNOSIS — F31.9 BIPOLAR 1 DISORDER (HCC): ICD-10-CM

## 2025-04-10 LAB — VALPROATE SERPL-MCNC: 65 UG/ML (ref 50–125)

## 2025-04-10 PROCEDURE — 36415 COLL VENOUS BLD VENIPUNCTURE: CPT

## 2025-04-10 PROCEDURE — 80164 ASSAY DIPROPYLACETIC ACD TOT: CPT

## 2025-04-16 ENCOUNTER — OFFICE VISIT (OUTPATIENT)
Dept: PSYCHIATRY | Facility: CLINIC | Age: 45
End: 2025-04-16
Payer: COMMERCIAL

## 2025-04-16 DIAGNOSIS — F31.9 BIPOLAR 1 DISORDER (HCC): Primary | ICD-10-CM

## 2025-04-16 DIAGNOSIS — G47.09 OTHER INSOMNIA: ICD-10-CM

## 2025-04-16 PROCEDURE — 99214 OFFICE O/P EST MOD 30 MIN: CPT | Performed by: PHYSICIAN ASSISTANT

## 2025-04-16 RX ORDER — DIVALPROEX SODIUM 500 MG/1
1000 TABLET, FILM COATED, EXTENDED RELEASE ORAL
Qty: 180 TABLET | Refills: 0 | Status: SHIPPED | OUTPATIENT
Start: 2025-04-16

## 2025-04-16 RX ORDER — RISPERIDONE 3 MG/1
3 TABLET ORAL DAILY
Qty: 90 TABLET | Refills: 0 | Status: SHIPPED | OUTPATIENT
Start: 2025-04-16 | End: 2025-07-15

## 2025-04-17 NOTE — PSYCH
"MEDICATION MANAGEMENT NOTE    Name: Garcia Cuevas      : 1980      MRN: [id number]  Encounter Provider: Ruby Card PA-C  Encounter Date: 2025   Encounter department: [facility]    Insurance: Payor: BLUE CROSS / Plan: [employer] BC PLAN 280 / Product Type: Blue Fee for Service /      Reason for Visit:   Chief Complaint   Patient presents with    Medication Management    Follow-up   :  Assessment & Plan  Bipolar 1 disorder (HCC)  Continue Depakote ER 1000 mg daily at bedtime  Risperdal 3 mg daily for mood      Orders:    risperiDONE (RisperDAL) 3 mg tablet; Take 1 tablet (3 mg total) by mouth daily    divalproex sodium (DEPAKOTE ER) 500 mg 24 hr tablet; Take 2 tablets (1,000 mg total) by mouth daily at bedtime    Other insomnia  Fairly well controlled at this time             Treatment Recommendations:    Educated about diagnosis and treatment modalities. Verbalizes understanding and agreement with the treatment plan.  Discussed self monitoring of symptoms, and symptom monitoring tools.  Discussed medications and if treatment adjustment was needed or desired.  Medication management every 2 months  Aware of 24 hour and weekend coverage for urgent situations accessed by calling [facility] main practice number  I am scheduling this patient out for greater than 3 months: No    Medications Risks/Benefits:      Risks, Benefits And Possible Side Effects Of Medications:    Risks, benefits, and possible side effects of medications explained to Garcia and he (or legal representative) verbalizes understanding and agreement for treatment.    Controlled Medication Discussion:     Not applicable      History of Present Illness     Garcia is seen today for a follow up for bipolar disorder and insomnia.  He reports \"things have been pretty good.\"  He shares that he is on probation for about 1 year.  He is still looking for a mental health evaluation.  He " notes that his  was able to give him some recommendations of places to call like [facility].  He notes he has been having some more issues with sleep lately.  His schedule is difficult because he works at night.  He is still getting 3 to 5 hours.  He has been eating well.  He denies any significant depressive symptoms he denies any significant anxiety.  He denies any irritability or recent outburst.  He denies any symptoms of jamarcus.  We discussed that his Depakote level was in the therapeutic range.    He denies suicidal ideation, intent or plan at present; denies homicidal ideation, intent or plan at present.    He denies auditory hallucinations, denies visual hallucinations, denies delusions.    He denies any side effects from current psychiatric medications.    HPI ROS Appetite Changes and Sleep:     He reports fluctuating sleep pattern, normal appetite, normal energy level    Review Of Systems: A review of systems is obtained and is negative except for the pertinent positives listed in HPI/Subjective above.      Current Rating Scores:     None completed today.    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[facility])     ADHD (attention deficit hyperactivity disorder)     Alcohol abuse     Anxiety     Bipolar disorder (HCC)     Depression     Obsessive-compulsive disorder     Panic attack     PTSD (post-traumatic stress disorder)     Self-injurious behavior     Substance abuse ([facility])      Past Surgical History:   Procedure Laterality Date    HAND SURGERY  1997    second metacarparl in left hand    WISDOM TOOTH EXTRACTION  1999     Allergies: No Known Allergies    Current Outpatient Medications   Medication Instructions    divalproex sodium (DEPAKOTE ER) 1,000 mg, Oral, Daily at bedtime    naproxen (NAPROSYN) 500 mg, Oral, 2 times daily with meals    risperiDONE (RISPERDAL) 3 mg, Oral, Daily    VITAMIN D PO 50,000 Units, Oral,  Weekly        Substance Abuse History:    Tobacco, Alcohol and Drug Use History     Tobacco Use    Smoking status: Every Day     Current packs/day: 0.50     Average packs/day: 1.4 packs/day for 11.0 years (15.1 ttl pk-yrs)     Types: Cigarettes     Start date: 2014     Last attempt to quit: 8/25/2023    Smokeless tobacco: Never    Tobacco comments:     trying to quit under 6 cigrates a day   Vaping Use    Vaping status: Never Used   Substance Use Topics    Alcohol use: Yes     Alcohol/week: 10.0 standard drinks of alcohol     Types: 10 Cans of beer per week    Drug use: Yes     Types: Marijuana, MDMA (ecstacy)     Comment: not on medical marijuana; obtains from dispensary. MDMA in the past last used summer 2023, first used in high school, restarted weekly since 2002 when dishonorably discharged from           Social History:    Social History     Socioeconomic History    Marital status: /Civil Union     Spouse name: Not on file    Number of children: 3    Years of education: some college    Highest education level: Some college, no degree   Occupational History    Occupation:  - [employer]   Other Topics Concern    Not on file   Social History Narrative    Not on file        Family Psychiatric History:     Family History   Problem Relation Age of Onset    Heart disease Mother     Osteoporosis Mother     Drug abuse Father     Alcohol abuse Father     HIV Sister     No Known Problems Brother     Drug abuse Maternal Uncle     Drug abuse Paternal Uncle        Medical History Reviewed by provider this encounter:  Tobacco  Allergies  Meds  Problems  Med Hx  Surg Hx  Fam Hx          Objective   There were no vitals taken for this visit.     Mental Status Evaluation:    Appearance age appropriate, casually dressed   Behavior cooperative, calm   Speech normal rate, normal volume, normal pitch, spontaneous   Mood euthymic   Affect normal range and intensity, appropriate   Thought  Processes organized, goal directed   Thought Content no overt delusions   Perceptual Disturbances: no auditory hallucinations, no visual hallucinations   Abnormal Thoughts  Risk Potential Suicidal ideation - None  Homicidal ideation - None  Potential for aggression - No   Orientation oriented to person, place, time/date, and situation   Memory recent and remote memory grossly intact   Consciousness alert and awake   Attention Span Concentration Span attention span and concentration are age appropriate   Intellect appears to be of average intelligence   Insight intact   Judgement intact   Muscle Strength and  Gait normal muscle strength and normal muscle tone, normal gait and normal balance   Motor activity no abnormal movements   Language no difficulty naming common objects, no difficulty repeating a phrase, no difficulty writing a sentence   Fund of Knowledge adequate knowledge of current events  adequate fund of knowledge regarding past history  adequate fund of knowledge regarding vocabulary    Pain none   Pain Scale 0       Laboratory Results: I have personally reviewed all pertinent laboratory/tests results    Depakote:   Lab Results   Component Value Date    VALPROICTOT 65 04/10/2025       Suicide/Homicide Risk Assessment:    Risk of Harm to Self:  Based on today's assessment, Garcia presents the following risk of harm to self: none    Risk of Harm to Others:  Based on today's assessment, Garcia presents the following risk of harm to others: none    The following interventions are recommended: Continue medication management.    Psychotherapy Provided:     Individual psychotherapy provided: No    Treatment Plan:    Completed and signed during the session:  not completed    Goals: Progress towards Treatment Plan goals - Yes, progressing, as evidenced by subjective findings in HPI/Subjective Section        Note Share:    This note was not shared with the patient due to reasonable likelihood of causing patient  harm        Visit Time  Visit Start Time: 9am  Visit Stop Time: 920am  Total Visit Duration:  20 minutes  This note was completed in part utilizing Dragon dictation Software. Grammatical, translation, syntax errors, random word insertions, spelling mistakes, and incomplete sentences may be an occasional consequence of this system secondary to software limitations with voice recognition, ambient noise, and hardware issues. If you have any questions or concerns about the content, text, or information contained within the body of this dictation, please contact the provider for clarification.     Ruby Card PA-C 04/17/25

## 2025-06-24 ENCOUNTER — TELEPHONE (OUTPATIENT)
Age: 45
End: 2025-06-24

## 2025-06-24 NOTE — TELEPHONE ENCOUNTER
Patient is calling regarding cancelling an appointment.    Date/Time: 7/8/25 @ 8:30a    Reason: out of town    Patient was rescheduled: YES [x] NO []  If yes, when was Patient reschedule for: 8/6/25 @ 10:30am

## 2025-07-15 DIAGNOSIS — F31.9 BIPOLAR 1 DISORDER (HCC): ICD-10-CM

## 2025-07-15 RX ORDER — RISPERIDONE 3 MG/1
3 TABLET ORAL DAILY
Qty: 90 TABLET | Refills: 0 | Status: SHIPPED | OUTPATIENT
Start: 2025-07-15

## 2025-07-18 ENCOUNTER — TELEPHONE (OUTPATIENT)
Age: 45
End: 2025-07-18

## 2025-08-06 ENCOUNTER — OFFICE VISIT (OUTPATIENT)
Dept: PSYCHIATRY | Facility: CLINIC | Age: 45
End: 2025-08-06
Payer: COMMERCIAL

## 2025-08-06 DIAGNOSIS — G47.09 OTHER INSOMNIA: ICD-10-CM

## 2025-08-06 DIAGNOSIS — F31.9 BIPOLAR 1 DISORDER (HCC): Primary | ICD-10-CM

## 2025-08-06 PROCEDURE — 99214 OFFICE O/P EST MOD 30 MIN: CPT | Performed by: PHYSICIAN ASSISTANT

## 2025-08-06 RX ORDER — TRAZODONE HYDROCHLORIDE 50 MG/1
50 TABLET ORAL
Qty: 30 TABLET | Refills: 2 | Status: SHIPPED | OUTPATIENT
Start: 2025-08-06 | End: 2025-11-04

## 2025-08-06 RX ORDER — RISPERIDONE 3 MG/1
3 TABLET ORAL DAILY
Qty: 90 TABLET | Refills: 0 | Status: SHIPPED | OUTPATIENT
Start: 2025-08-06

## 2025-08-06 RX ORDER — DIVALPROEX SODIUM 500 MG/1
1000 TABLET, FILM COATED, EXTENDED RELEASE ORAL
Qty: 180 TABLET | Refills: 0 | Status: SHIPPED | OUTPATIENT
Start: 2025-08-06